# Patient Record
Sex: FEMALE | Race: OTHER | HISPANIC OR LATINO | URBAN - METROPOLITAN AREA
[De-identification: names, ages, dates, MRNs, and addresses within clinical notes are randomized per-mention and may not be internally consistent; named-entity substitution may affect disease eponyms.]

---

## 2017-11-15 ENCOUNTER — OUTPATIENT (OUTPATIENT)
Dept: OUTPATIENT SERVICES | Facility: HOSPITAL | Age: 32
LOS: 1 days | End: 2017-11-15

## 2017-11-15 DIAGNOSIS — O26.899 OTHER SPECIFIED PREGNANCY RELATED CONDITIONS, UNSPECIFIED TRIMESTER: ICD-10-CM

## 2017-11-15 DIAGNOSIS — Z3A.00 WEEKS OF GESTATION OF PREGNANCY NOT SPECIFIED: ICD-10-CM

## 2017-11-25 ENCOUNTER — INPATIENT (INPATIENT)
Facility: HOSPITAL | Age: 32
LOS: 1 days | Discharge: HOME | End: 2017-11-27
Attending: OBSTETRICS & GYNECOLOGY | Admitting: OBSTETRICS & GYNECOLOGY

## 2017-11-25 VITALS — HEIGHT: 65 IN | WEIGHT: 246.92 LBS

## 2017-11-25 DIAGNOSIS — O26.899 OTHER SPECIFIED PREGNANCY RELATED CONDITIONS, UNSPECIFIED TRIMESTER: ICD-10-CM

## 2017-11-25 LAB
ALBUMIN SERPL ELPH-MCNC: 3.4 G/DL — SIGNIFICANT CHANGE UP (ref 3.3–5)
ALP SERPL-CCNC: 196 U/L — HIGH (ref 40–120)
ALT FLD-CCNC: 7 U/L — SIGNIFICANT CHANGE UP (ref 4–33)
APPEARANCE UR: SIGNIFICANT CHANGE UP
APTT BLD: 26.1 SEC — LOW (ref 27.5–37.4)
AST SERPL-CCNC: 13 U/L — SIGNIFICANT CHANGE UP (ref 4–32)
BACTERIA # UR AUTO: SIGNIFICANT CHANGE UP
BASOPHILS # BLD AUTO: 0.04 K/UL — SIGNIFICANT CHANGE UP (ref 0–0.2)
BASOPHILS NFR BLD AUTO: 0.3 % — SIGNIFICANT CHANGE UP (ref 0–2)
BILIRUB SERPL-MCNC: 0.2 MG/DL — SIGNIFICANT CHANGE UP (ref 0.2–1.2)
BILIRUB UR-MCNC: NEGATIVE — SIGNIFICANT CHANGE UP
BLD GP AB SCN SERPL QL: NEGATIVE — SIGNIFICANT CHANGE UP
BLOOD UR QL VISUAL: HIGH
BUN SERPL-MCNC: 5 MG/DL — LOW (ref 7–23)
CALCIUM SERPL-MCNC: 8.6 MG/DL — SIGNIFICANT CHANGE UP (ref 8.4–10.5)
CHLORIDE SERPL-SCNC: 101 MMOL/L — SIGNIFICANT CHANGE UP (ref 98–107)
CO2 SERPL-SCNC: 22 MMOL/L — SIGNIFICANT CHANGE UP (ref 22–31)
COLOR SPEC: SIGNIFICANT CHANGE UP
CREAT ?TM UR-MCNC: 36.5 MG/DL — SIGNIFICANT CHANGE UP
CREAT SERPL-MCNC: 0.38 MG/DL — LOW (ref 0.5–1.3)
EOSINOPHIL # BLD AUTO: 0.06 K/UL — SIGNIFICANT CHANGE UP (ref 0–0.5)
EOSINOPHIL NFR BLD AUTO: 0.4 % — SIGNIFICANT CHANGE UP (ref 0–6)
FIBRINOGEN PPP-MCNC: 670 MG/DL — HIGH (ref 310–510)
GLUCOSE SERPL-MCNC: 93 MG/DL — SIGNIFICANT CHANGE UP (ref 70–99)
GLUCOSE UR-MCNC: NEGATIVE — SIGNIFICANT CHANGE UP
HCT VFR BLD CALC: 41.7 % — SIGNIFICANT CHANGE UP (ref 34.5–45)
HGB BLD-MCNC: 13.3 G/DL — SIGNIFICANT CHANGE UP (ref 11.5–15.5)
IMM GRANULOCYTES # BLD AUTO: 0.1 # — SIGNIFICANT CHANGE UP
IMM GRANULOCYTES NFR BLD AUTO: 0.7 % — SIGNIFICANT CHANGE UP (ref 0–1.5)
INR BLD: 0.9 — SIGNIFICANT CHANGE UP (ref 0.88–1.17)
KETONES UR-MCNC: NEGATIVE — SIGNIFICANT CHANGE UP
LDH SERPL L TO P-CCNC: 212 U/L — SIGNIFICANT CHANGE UP (ref 135–225)
LEUKOCYTE ESTERASE UR-ACNC: HIGH
LYMPHOCYTES # BLD AUTO: 19 % — SIGNIFICANT CHANGE UP (ref 13–44)
LYMPHOCYTES # BLD AUTO: 2.56 K/UL — SIGNIFICANT CHANGE UP (ref 1–3.3)
MCHC RBC-ENTMCNC: 26.9 PG — LOW (ref 27–34)
MCHC RBC-ENTMCNC: 31.9 % — LOW (ref 32–36)
MCV RBC AUTO: 84.4 FL — SIGNIFICANT CHANGE UP (ref 80–100)
MONOCYTES # BLD AUTO: 0.94 K/UL — HIGH (ref 0–0.9)
MONOCYTES NFR BLD AUTO: 7 % — SIGNIFICANT CHANGE UP (ref 2–14)
NEUTROPHILS # BLD AUTO: 9.77 K/UL — HIGH (ref 1.8–7.4)
NEUTROPHILS NFR BLD AUTO: 72.6 % — SIGNIFICANT CHANGE UP (ref 43–77)
NITRITE UR-MCNC: NEGATIVE — SIGNIFICANT CHANGE UP
NON-SQ EPI CELLS # UR AUTO: 1 — SIGNIFICANT CHANGE UP
NRBC # FLD: 0 — SIGNIFICANT CHANGE UP
PH UR: 7 — SIGNIFICANT CHANGE UP (ref 4.6–8)
PLATELET # BLD AUTO: 314 K/UL — SIGNIFICANT CHANGE UP (ref 150–400)
PMV BLD: 10.8 FL — SIGNIFICANT CHANGE UP (ref 7–13)
POTASSIUM SERPL-MCNC: 3.7 MMOL/L — SIGNIFICANT CHANGE UP (ref 3.5–5.3)
POTASSIUM SERPL-SCNC: 3.7 MMOL/L — SIGNIFICANT CHANGE UP (ref 3.5–5.3)
PROT SERPL-MCNC: 6.5 G/DL — SIGNIFICANT CHANGE UP (ref 6–8.3)
PROT UR-MCNC: 26.5 MG/DL — SIGNIFICANT CHANGE UP
PROT UR-MCNC: 30 — HIGH
PROTHROM AB SERPL-ACNC: 10.1 SEC — SIGNIFICANT CHANGE UP (ref 9.8–13.1)
RBC # BLD: 4.94 M/UL — SIGNIFICANT CHANGE UP (ref 3.8–5.2)
RBC # FLD: 16 % — HIGH (ref 10.3–14.5)
RBC CASTS # UR COMP ASSIST: >50 — HIGH (ref 0–?)
RH IG SCN BLD-IMP: POSITIVE — SIGNIFICANT CHANGE UP
SODIUM SERPL-SCNC: 138 MMOL/L — SIGNIFICANT CHANGE UP (ref 135–145)
SP GR SPEC: 1.01 — SIGNIFICANT CHANGE UP (ref 1–1.03)
SQUAMOUS # UR AUTO: SIGNIFICANT CHANGE UP
URATE SERPL-MCNC: 4.5 MG/DL — SIGNIFICANT CHANGE UP (ref 2.5–7)
UROBILINOGEN FLD QL: NORMAL E.U. — SIGNIFICANT CHANGE UP (ref 0.1–0.2)
WBC # BLD: 13.47 K/UL — HIGH (ref 3.8–10.5)
WBC # FLD AUTO: 13.47 K/UL — HIGH (ref 3.8–10.5)
WBC CLUMPS #/AREA URNS HPF: PRESENT — HIGH (ref 0–?)
WBC UR QL: >50 — HIGH (ref 0–?)

## 2017-11-25 RX ORDER — TETANUS TOXOID, REDUCED DIPHTHERIA TOXOID AND ACELLULAR PERTUSSIS VACCINE, ADSORBED 5; 2.5; 8; 8; 2.5 [IU]/.5ML; [IU]/.5ML; UG/.5ML; UG/.5ML; UG/.5ML
0.5 SUSPENSION INTRAMUSCULAR ONCE
Qty: 0 | Refills: 0 | Status: DISCONTINUED | OUTPATIENT
Start: 2017-11-25 | End: 2017-11-25

## 2017-11-25 RX ORDER — TETANUS TOXOID, REDUCED DIPHTHERIA TOXOID AND ACELLULAR PERTUSSIS VACCINE, ADSORBED 5; 2.5; 8; 8; 2.5 [IU]/.5ML; [IU]/.5ML; UG/.5ML; UG/.5ML; UG/.5ML
0.5 SUSPENSION INTRAMUSCULAR ONCE
Qty: 0 | Refills: 0 | Status: COMPLETED | OUTPATIENT
Start: 2017-11-25

## 2017-11-25 RX ORDER — DIPHENHYDRAMINE HCL 50 MG
25 CAPSULE ORAL EVERY 6 HOURS
Qty: 0 | Refills: 0 | Status: DISCONTINUED | OUTPATIENT
Start: 2017-11-25 | End: 2017-11-25

## 2017-11-25 RX ORDER — HYDROCORTISONE 1 %
1 OINTMENT (GRAM) TOPICAL EVERY 4 HOURS
Qty: 0 | Refills: 0 | Status: DISCONTINUED | OUTPATIENT
Start: 2017-11-25 | End: 2017-11-25

## 2017-11-25 RX ORDER — DOCUSATE SODIUM 100 MG
100 CAPSULE ORAL
Qty: 0 | Refills: 0 | Status: DISCONTINUED | OUTPATIENT
Start: 2017-11-25 | End: 2017-11-27

## 2017-11-25 RX ORDER — HYDROCORTISONE 1 %
1 OINTMENT (GRAM) TOPICAL EVERY 4 HOURS
Qty: 0 | Refills: 0 | Status: DISCONTINUED | OUTPATIENT
Start: 2017-11-25 | End: 2017-11-27

## 2017-11-25 RX ORDER — DIBUCAINE 1 %
1 OINTMENT (GRAM) RECTAL EVERY 4 HOURS
Qty: 0 | Refills: 0 | Status: DISCONTINUED | OUTPATIENT
Start: 2017-11-25 | End: 2017-11-25

## 2017-11-25 RX ORDER — KETOROLAC TROMETHAMINE 30 MG/ML
30 SYRINGE (ML) INJECTION ONCE
Qty: 0 | Refills: 0 | Status: DISCONTINUED | OUTPATIENT
Start: 2017-11-25 | End: 2017-11-27

## 2017-11-25 RX ORDER — AER TRAVELER 0.5 G/1
1 SOLUTION RECTAL; TOPICAL EVERY 4 HOURS
Qty: 0 | Refills: 0 | Status: DISCONTINUED | OUTPATIENT
Start: 2017-11-25 | End: 2017-11-27

## 2017-11-25 RX ORDER — GLYCERIN ADULT
1 SUPPOSITORY, RECTAL RECTAL AT BEDTIME
Qty: 0 | Refills: 0 | Status: DISCONTINUED | OUTPATIENT
Start: 2017-11-25 | End: 2017-11-25

## 2017-11-25 RX ORDER — OXYTOCIN 10 UNIT/ML
333.33 VIAL (ML) INJECTION
Qty: 20 | Refills: 0 | Status: DISCONTINUED | OUTPATIENT
Start: 2017-11-25 | End: 2017-11-26

## 2017-11-25 RX ORDER — OXYCODONE AND ACETAMINOPHEN 5; 325 MG/1; MG/1
2 TABLET ORAL EVERY 6 HOURS
Qty: 0 | Refills: 0 | Status: DISCONTINUED | OUTPATIENT
Start: 2017-11-25 | End: 2017-11-27

## 2017-11-25 RX ORDER — ACETAMINOPHEN 500 MG
975 TABLET ORAL EVERY 6 HOURS
Qty: 0 | Refills: 0 | Status: COMPLETED | OUTPATIENT
Start: 2017-11-25 | End: 2018-10-24

## 2017-11-25 RX ORDER — OXYCODONE HYDROCHLORIDE 5 MG/1
5 TABLET ORAL
Qty: 0 | Refills: 0 | Status: DISCONTINUED | OUTPATIENT
Start: 2017-11-25 | End: 2017-11-27

## 2017-11-25 RX ORDER — SODIUM CHLORIDE 9 MG/ML
3 INJECTION INTRAMUSCULAR; INTRAVENOUS; SUBCUTANEOUS EVERY 8 HOURS
Qty: 0 | Refills: 0 | Status: DISCONTINUED | OUTPATIENT
Start: 2017-11-25 | End: 2017-11-25

## 2017-11-25 RX ORDER — OXYTOCIN 10 UNIT/ML
41.67 VIAL (ML) INJECTION
Qty: 20 | Refills: 0 | Status: DISCONTINUED | OUTPATIENT
Start: 2017-11-25 | End: 2017-11-26

## 2017-11-25 RX ORDER — ACETAMINOPHEN 500 MG
975 TABLET ORAL EVERY 6 HOURS
Qty: 0 | Refills: 0 | Status: DISCONTINUED | OUTPATIENT
Start: 2017-11-25 | End: 2017-11-27

## 2017-11-25 RX ORDER — OXYTOCIN 10 UNIT/ML
41.67 VIAL (ML) INJECTION
Qty: 20 | Refills: 0 | Status: DISCONTINUED | OUTPATIENT
Start: 2017-11-25 | End: 2017-11-25

## 2017-11-25 RX ORDER — SODIUM CHLORIDE 9 MG/ML
3 INJECTION INTRAMUSCULAR; INTRAVENOUS; SUBCUTANEOUS EVERY 8 HOURS
Qty: 0 | Refills: 0 | Status: DISCONTINUED | OUTPATIENT
Start: 2017-11-25 | End: 2017-11-27

## 2017-11-25 RX ORDER — AER TRAVELER 0.5 G/1
1 SOLUTION RECTAL; TOPICAL EVERY 4 HOURS
Qty: 0 | Refills: 0 | Status: DISCONTINUED | OUTPATIENT
Start: 2017-11-25 | End: 2017-11-25

## 2017-11-25 RX ORDER — LANOLIN
1 OINTMENT (GRAM) TOPICAL EVERY 6 HOURS
Qty: 0 | Refills: 0 | Status: DISCONTINUED | OUTPATIENT
Start: 2017-11-25 | End: 2017-11-25

## 2017-11-25 RX ORDER — SODIUM CHLORIDE 9 MG/ML
1000 INJECTION, SOLUTION INTRAVENOUS
Qty: 0 | Refills: 0 | Status: DISCONTINUED | OUTPATIENT
Start: 2017-11-25 | End: 2017-11-25

## 2017-11-25 RX ORDER — SODIUM CHLORIDE 9 MG/ML
1000 INJECTION, SOLUTION INTRAVENOUS ONCE
Qty: 0 | Refills: 0 | Status: COMPLETED | OUTPATIENT
Start: 2017-11-25 | End: 2017-11-25

## 2017-11-25 RX ORDER — PRAMOXINE HYDROCHLORIDE 150 MG/15G
1 AEROSOL, FOAM RECTAL EVERY 4 HOURS
Qty: 0 | Refills: 0 | Status: DISCONTINUED | OUTPATIENT
Start: 2017-11-25 | End: 2017-11-27

## 2017-11-25 RX ORDER — OXYCODONE HYDROCHLORIDE 5 MG/1
5 TABLET ORAL
Qty: 0 | Refills: 0 | Status: DISCONTINUED | OUTPATIENT
Start: 2017-11-25 | End: 2017-11-25

## 2017-11-25 RX ORDER — DIBUCAINE 1 %
1 OINTMENT (GRAM) RECTAL EVERY 4 HOURS
Qty: 0 | Refills: 0 | Status: DISCONTINUED | OUTPATIENT
Start: 2017-11-25 | End: 2017-11-27

## 2017-11-25 RX ORDER — MAGNESIUM HYDROXIDE 400 MG/1
30 TABLET, CHEWABLE ORAL
Qty: 0 | Refills: 0 | Status: DISCONTINUED | OUTPATIENT
Start: 2017-11-25 | End: 2017-11-27

## 2017-11-25 RX ORDER — PRAMOXINE HYDROCHLORIDE 150 MG/15G
1 AEROSOL, FOAM RECTAL EVERY 4 HOURS
Qty: 0 | Refills: 0 | Status: DISCONTINUED | OUTPATIENT
Start: 2017-11-25 | End: 2017-11-25

## 2017-11-25 RX ORDER — IBUPROFEN 200 MG
600 TABLET ORAL EVERY 6 HOURS
Qty: 0 | Refills: 0 | Status: DISCONTINUED | OUTPATIENT
Start: 2017-11-25 | End: 2017-11-27

## 2017-11-25 RX ORDER — OXYCODONE HYDROCHLORIDE 5 MG/1
5 TABLET ORAL EVERY 4 HOURS
Qty: 0 | Refills: 0 | Status: DISCONTINUED | OUTPATIENT
Start: 2017-11-25 | End: 2017-11-25

## 2017-11-25 RX ORDER — OXYCODONE HYDROCHLORIDE 5 MG/1
5 TABLET ORAL EVERY 4 HOURS
Qty: 0 | Refills: 0 | Status: DISCONTINUED | OUTPATIENT
Start: 2017-11-25 | End: 2017-11-27

## 2017-11-25 RX ORDER — DOCUSATE SODIUM 100 MG
100 CAPSULE ORAL
Qty: 0 | Refills: 0 | Status: DISCONTINUED | OUTPATIENT
Start: 2017-11-25 | End: 2017-11-25

## 2017-11-25 RX ORDER — SIMETHICONE 80 MG/1
80 TABLET, CHEWABLE ORAL EVERY 6 HOURS
Qty: 0 | Refills: 0 | Status: DISCONTINUED | OUTPATIENT
Start: 2017-11-25 | End: 2017-11-25

## 2017-11-25 RX ORDER — KETOROLAC TROMETHAMINE 30 MG/ML
30 SYRINGE (ML) INJECTION ONCE
Qty: 0 | Refills: 0 | Status: DISCONTINUED | OUTPATIENT
Start: 2017-11-25 | End: 2017-11-25

## 2017-11-25 RX ORDER — IBUPROFEN 200 MG
600 TABLET ORAL EVERY 6 HOURS
Qty: 0 | Refills: 0 | Status: COMPLETED | OUTPATIENT
Start: 2017-11-25 | End: 2018-10-24

## 2017-11-25 RX ORDER — LANOLIN
1 OINTMENT (GRAM) TOPICAL EVERY 6 HOURS
Qty: 0 | Refills: 0 | Status: DISCONTINUED | OUTPATIENT
Start: 2017-11-25 | End: 2017-11-27

## 2017-11-25 RX ORDER — SIMETHICONE 80 MG/1
80 TABLET, CHEWABLE ORAL EVERY 6 HOURS
Qty: 0 | Refills: 0 | Status: DISCONTINUED | OUTPATIENT
Start: 2017-11-25 | End: 2017-11-27

## 2017-11-25 RX ORDER — MAGNESIUM HYDROXIDE 400 MG/1
30 TABLET, CHEWABLE ORAL
Qty: 0 | Refills: 0 | Status: DISCONTINUED | OUTPATIENT
Start: 2017-11-25 | End: 2017-11-25

## 2017-11-25 RX ORDER — OXYTOCIN 10 UNIT/ML
333.33 VIAL (ML) INJECTION
Qty: 20 | Refills: 0 | Status: COMPLETED | OUTPATIENT
Start: 2017-11-25

## 2017-11-25 RX ORDER — DIPHENHYDRAMINE HCL 50 MG
25 CAPSULE ORAL EVERY 6 HOURS
Qty: 0 | Refills: 0 | Status: DISCONTINUED | OUTPATIENT
Start: 2017-11-25 | End: 2017-11-27

## 2017-11-25 RX ORDER — GLYCERIN ADULT
1 SUPPOSITORY, RECTAL RECTAL AT BEDTIME
Qty: 0 | Refills: 0 | Status: DISCONTINUED | OUTPATIENT
Start: 2017-11-25 | End: 2017-11-27

## 2017-11-25 RX ADMIN — Medication 30 MILLIGRAM(S): at 18:35

## 2017-11-25 RX ADMIN — SODIUM CHLORIDE 2000 MILLILITER(S): 9 INJECTION, SOLUTION INTRAVENOUS at 09:10

## 2017-11-25 RX ADMIN — SODIUM CHLORIDE 125 MILLILITER(S): 9 INJECTION, SOLUTION INTRAVENOUS at 08:00

## 2017-11-25 RX ADMIN — Medication 1000 MILLIUNIT(S)/MIN: at 18:09

## 2017-11-25 RX ADMIN — Medication 30 MILLIGRAM(S): at 18:46

## 2017-11-25 RX ADMIN — Medication 125 MILLIUNIT(S)/MIN: at 18:10

## 2017-11-26 RX ORDER — TETANUS TOXOID, REDUCED DIPHTHERIA TOXOID AND ACELLULAR PERTUSSIS VACCINE, ADSORBED 5; 2.5; 8; 8; 2.5 [IU]/.5ML; [IU]/.5ML; UG/.5ML; UG/.5ML; UG/.5ML
0.5 SUSPENSION INTRAMUSCULAR ONCE
Qty: 0 | Refills: 0 | Status: COMPLETED | OUTPATIENT
Start: 2017-11-26 | End: 2017-11-26

## 2017-11-26 RX ORDER — ACETAMINOPHEN 500 MG
975 TABLET ORAL EVERY 6 HOURS
Qty: 0 | Refills: 0 | Status: DISCONTINUED | OUTPATIENT
Start: 2017-11-26 | End: 2017-11-27

## 2017-11-26 RX ADMIN — Medication 600 MILLIGRAM(S): at 18:32

## 2017-11-26 RX ADMIN — Medication 100 MILLIGRAM(S): at 11:25

## 2017-11-26 RX ADMIN — Medication 975 MILLIGRAM(S): at 18:32

## 2017-11-26 RX ADMIN — TETANUS TOXOID, REDUCED DIPHTHERIA TOXOID AND ACELLULAR PERTUSSIS VACCINE, ADSORBED 0.5 MILLILITER(S): 5; 2.5; 8; 8; 2.5 SUSPENSION INTRAMUSCULAR at 21:41

## 2017-11-26 RX ADMIN — Medication 975 MILLIGRAM(S): at 11:24

## 2017-11-26 RX ADMIN — Medication 600 MILLIGRAM(S): at 17:33

## 2017-11-26 RX ADMIN — Medication 600 MILLIGRAM(S): at 11:24

## 2017-11-26 RX ADMIN — Medication 1 TABLET(S): at 11:24

## 2017-11-26 RX ADMIN — Medication 100 MILLIGRAM(S): at 05:13

## 2017-11-26 RX ADMIN — Medication 975 MILLIGRAM(S): at 17:33

## 2017-11-26 RX ADMIN — Medication 600 MILLIGRAM(S): at 05:13

## 2017-11-26 RX ADMIN — Medication 975 MILLIGRAM(S): at 05:13

## 2017-11-26 RX ADMIN — Medication 600 MILLIGRAM(S): at 12:22

## 2017-11-26 RX ADMIN — Medication 600 MILLIGRAM(S): at 06:13

## 2017-11-26 NOTE — PROGRESS NOTE ADULT - SUBJECTIVE AND OBJECTIVE BOX
Patient seen and examined at bedside, no acute overnight events. No acute complaints, pain well controlled with pain medication, c/o pain with breastfeeding as she has increased cramping. Patient is ambulating, voiding spontaneously, passing gas, and tolerating regular diet. Pt is breast feeding her baby.    Vital Signs Last 24 Hours  T(C): 36.9 (11-26-17 @ 01:49), Max: 37.1 (11-25-17 @ 17:51)  HR: 72 (11-26-17 @ 01:49) (68 - 101)  BP: 110/67 (11-26-17 @ 01:49) (110/67 - 137/62)  RR: 18 (11-26-17 @ 01:49) (16 - 18)  SpO2: 97% (11-26-17 @ 01:49) (97% - 100%)    Physical Exam:  General: NAD  Abdomen: Soft, non-tender, non-distended, fundus firm  Pelvic: Lochia wnl    Labs:    Blood Type: O Positive  Antibody Screen: Negative               13.3   13.47 )-----------( 314      ( 11-25 @ 05:40 )             41.7         MEDICATIONS  (STANDING):  acetaminophen   Tablet. 975 milliGRAM(s) Oral every 6 hours  diphtheria/tetanus/pertussis (acellular) Vaccine (ADAcel) 0.5 milliLiter(s) IntraMuscular once  ibuprofen  Tablet 600 milliGRAM(s) Oral every 6 hours  ibuprofen  Tablet 600 milliGRAM(s) Oral every 6 hours  ketorolac   Injectable 30 milliGRAM(s) IV Push once  oxyCODONE    IR 5 milliGRAM(s) Oral every 3 hours  oxytocin Infusion 333.333 milliUNIT(s)/Min (1000 mL/Hr) IV Continuous <Continuous>  oxytocin Infusion 41.667 milliUNIT(s)/Min (125 mL/Hr) IV Continuous <Continuous>  prenatal multivitamin 1 Tablet(s) Oral daily  sodium chloride 0.9% lock flush 3 milliLiter(s) IV Push every 8 hours    MEDICATIONS  (PRN):  acetaminophen   Tablet 975 milliGRAM(s) Oral every 6 hours PRN For Temp greater than 38.5 C (101.3 F)  dibucaine 1% Ointment 1 Application(s) Topical every 4 hours PRN Perineal Discomfort  diphenhydrAMINE   Capsule 25 milliGRAM(s) Oral every 6 hours PRN Itching  docusate sodium 100 milliGRAM(s) Oral two times a day PRN Stool Softening  glycerin Suppository - Adult 1 Suppository(s) Rectal at bedtime PRN Constipation  hydrocortisone 1% Cream 1 Application(s) Topical every 4 hours PRN Moderate to Severe Perineal Pain  lanolin Ointment 1 Application(s) Topical every 6 hours PRN Sore Nipples  magnesium hydroxide Suspension 30 milliLiter(s) Oral two times a day PRN Constipation  oxyCODONE    5 mG/acetaminophen 325 mG 2 Tablet(s) Oral every 6 hours PRN Severe Pain  oxyCODONE    IR 5 milliGRAM(s) Oral every 4 hours PRN Severe Pain (7 -10)  pramoxine 1%/zinc 5% Cream 1 Application(s) Topical every 4 hours PRN Moderate to Severe Perineal Pain  simethicone 80 milliGRAM(s) Chew every 6 hours PRN Gas  witch hazel Pads 1 Application(s) Topical every 4 hours PRN Perineal Discomfort

## 2017-11-27 ENCOUNTER — TRANSCRIPTION ENCOUNTER (OUTPATIENT)
Age: 32
End: 2017-11-27

## 2017-11-27 VITALS
TEMPERATURE: 98 F | HEART RATE: 65 BPM | RESPIRATION RATE: 18 BRPM | SYSTOLIC BLOOD PRESSURE: 114 MMHG | DIASTOLIC BLOOD PRESSURE: 66 MMHG | OXYGEN SATURATION: 98 %

## 2017-11-27 DIAGNOSIS — E05.90 THYROTOXICOSIS, UNSPECIFIED WITHOUT THYROTOXIC CRISIS OR STORM: ICD-10-CM

## 2017-11-27 DIAGNOSIS — O42.10 PREMATURE RUPTURE OF MEMBRANES, ONSET OF LABOR MORE THAN 24 HOURS FOLLOWING RUPTURE, UNSPECIFIED WEEKS OF GESTATION: ICD-10-CM

## 2017-11-27 LAB — T PALLIDUM AB TITR SER: NEGATIVE — SIGNIFICANT CHANGE UP

## 2017-11-27 RX ORDER — PROPYLTHIOURACIL 50 MG
0 TABLET ORAL
Qty: 0 | Refills: 0 | COMMUNITY

## 2017-11-27 RX ORDER — IBUPROFEN 200 MG
1 TABLET ORAL
Qty: 0 | Refills: 0 | DISCHARGE
Start: 2017-11-27

## 2017-11-27 RX ADMIN — Medication 600 MILLIGRAM(S): at 00:13

## 2017-11-27 RX ADMIN — Medication 975 MILLIGRAM(S): at 01:00

## 2017-11-27 RX ADMIN — Medication 600 MILLIGRAM(S): at 01:00

## 2017-11-27 RX ADMIN — Medication 975 MILLIGRAM(S): at 00:13

## 2017-11-27 RX ADMIN — Medication 975 MILLIGRAM(S): at 06:50

## 2017-11-27 RX ADMIN — Medication 600 MILLIGRAM(S): at 06:50

## 2017-11-27 NOTE — DISCHARGE NOTE OB - MEDICATION SUMMARY - MEDICATIONS TO TAKE
I will START or STAY ON the medications listed below when I get home from the hospital:    ibuprofen 600 mg oral tablet  -- 1 tab(s) by mouth every 6 hours  -- Indication: For Vaginal delivery    Prenatal Multivitamins with Folic Acid 1 mg oral tablet  -- 1 tab(s) by mouth once a day  -- Indication: For Vaginal delivery

## 2017-11-27 NOTE — DISCHARGE NOTE OB - CARE PLAN
Principal Discharge DX:	Vaginal delivery  Goal:	normal postpartum course  Instructions for follow-up, activity and diet:	normal diet and activity  Secondary Diagnosis:	Hyperthyroidism

## 2017-11-27 NOTE — PROGRESS NOTE ADULT - ASSESSMENT
33y/o  PPD2 sp  in stable condition. Dx w/ PEC by BP and P/C after delivery not  reuiring meds, BP well controlled. PMH sig for hyperthyroid, currently on PTU and nursing.

## 2017-11-27 NOTE — DISCHARGE NOTE OB - HOSPITAL COURSE
Patient had Patient had normal vaginal delivery without any complications and normal postpartum course

## 2017-11-27 NOTE — DISCHARGE NOTE OB - PATIENT PORTAL LINK FT
“You can access the [facility] Patient Portal, offered by [facility], by registering with the following website: [email or web address]”

## 2017-11-27 NOTE — PROGRESS NOTE ADULT - PROBLEM SELECTOR PLAN 2
-Currently on PTU 50mg TID, although pt was only taking medication once daily and has not been taking it in postpartum period for fear of affect on lactation  -PTU is garnered safe in lactation, LFTs should be periodically examined   -Pt will need f/u with Dr Khanna for medication adjustment and evaluation of thyroid function post partum   -asymptomatic at this time

## 2018-09-26 ENCOUNTER — EMERGENCY (EMERGENCY)
Facility: HOSPITAL | Age: 33
LOS: 1 days | Discharge: HOME | End: 2018-09-26
Attending: EMERGENCY MEDICINE
Payer: COMMERCIAL

## 2018-09-26 VITALS
OXYGEN SATURATION: 99 % | RESPIRATION RATE: 16 BRPM | SYSTOLIC BLOOD PRESSURE: 113 MMHG | DIASTOLIC BLOOD PRESSURE: 72 MMHG | HEART RATE: 70 BPM | TEMPERATURE: 98 F

## 2018-09-26 VITALS
RESPIRATION RATE: 16 BRPM | HEIGHT: 65 IN | SYSTOLIC BLOOD PRESSURE: 137 MMHG | HEART RATE: 73 BPM | DIASTOLIC BLOOD PRESSURE: 89 MMHG | OXYGEN SATURATION: 100 % | WEIGHT: 220.02 LBS | TEMPERATURE: 98 F

## 2018-09-26 LAB
ALBUMIN SERPL ELPH-MCNC: 3.3 G/DL — LOW (ref 3.5–5)
ALP SERPL-CCNC: 77 U/L — SIGNIFICANT CHANGE UP (ref 40–120)
ALT FLD-CCNC: 20 U/L DA — SIGNIFICANT CHANGE UP (ref 10–60)
ANION GAP SERPL CALC-SCNC: 5 MMOL/L — SIGNIFICANT CHANGE UP (ref 5–17)
AST SERPL-CCNC: 10 U/L — SIGNIFICANT CHANGE UP (ref 10–40)
BASOPHILS # BLD AUTO: 0 K/UL — SIGNIFICANT CHANGE UP (ref 0–0.2)
BASOPHILS NFR BLD AUTO: 0.5 % — SIGNIFICANT CHANGE UP (ref 0–2)
BILIRUB SERPL-MCNC: 0.4 MG/DL — SIGNIFICANT CHANGE UP (ref 0.2–1.2)
BUN SERPL-MCNC: 6 MG/DL — LOW (ref 7–18)
CALCIUM SERPL-MCNC: 8.5 MG/DL — SIGNIFICANT CHANGE UP (ref 8.4–10.5)
CHLORIDE SERPL-SCNC: 104 MMOL/L — SIGNIFICANT CHANGE UP (ref 96–108)
CO2 SERPL-SCNC: 27 MMOL/L — SIGNIFICANT CHANGE UP (ref 22–31)
CREAT SERPL-MCNC: 0.52 MG/DL — SIGNIFICANT CHANGE UP (ref 0.5–1.3)
EOSINOPHIL # BLD AUTO: 0.1 K/UL — SIGNIFICANT CHANGE UP (ref 0–0.5)
EOSINOPHIL NFR BLD AUTO: 1.2 % — SIGNIFICANT CHANGE UP (ref 0–6)
GLUCOSE SERPL-MCNC: 101 MG/DL — HIGH (ref 70–99)
HCG UR QL: NEGATIVE — SIGNIFICANT CHANGE UP
HCT VFR BLD CALC: 42.7 % — SIGNIFICANT CHANGE UP (ref 34.5–45)
HGB BLD-MCNC: 13.7 G/DL — SIGNIFICANT CHANGE UP (ref 11.5–15.5)
LIDOCAIN IGE QN: 74 U/L — SIGNIFICANT CHANGE UP (ref 73–393)
LYMPHOCYTES # BLD AUTO: 1.1 K/UL — SIGNIFICANT CHANGE UP (ref 1–3.3)
LYMPHOCYTES # BLD AUTO: 10.6 % — LOW (ref 13–44)
MCHC RBC-ENTMCNC: 28 PG — SIGNIFICANT CHANGE UP (ref 27–34)
MCHC RBC-ENTMCNC: 32.1 GM/DL — SIGNIFICANT CHANGE UP (ref 32–36)
MCV RBC AUTO: 87.3 FL — SIGNIFICANT CHANGE UP (ref 80–100)
MONOCYTES # BLD AUTO: 0.6 K/UL — SIGNIFICANT CHANGE UP (ref 0–0.9)
MONOCYTES NFR BLD AUTO: 6.4 % — SIGNIFICANT CHANGE UP (ref 2–14)
NEUTROPHILS # BLD AUTO: 8.2 K/UL — HIGH (ref 1.8–7.4)
NEUTROPHILS NFR BLD AUTO: 81.3 % — HIGH (ref 43–77)
PLATELET # BLD AUTO: 275 K/UL — SIGNIFICANT CHANGE UP (ref 150–400)
POTASSIUM SERPL-MCNC: 4.4 MMOL/L — SIGNIFICANT CHANGE UP (ref 3.5–5.3)
POTASSIUM SERPL-SCNC: 4.4 MMOL/L — SIGNIFICANT CHANGE UP (ref 3.5–5.3)
PROT SERPL-MCNC: 7.5 G/DL — SIGNIFICANT CHANGE UP (ref 6–8.3)
RBC # BLD: 4.9 M/UL — SIGNIFICANT CHANGE UP (ref 3.8–5.2)
RBC # FLD: 12.8 % — SIGNIFICANT CHANGE UP (ref 10.3–14.5)
SODIUM SERPL-SCNC: 136 MMOL/L — SIGNIFICANT CHANGE UP (ref 135–145)
WBC # BLD: 10.2 K/UL — SIGNIFICANT CHANGE UP (ref 3.8–10.5)
WBC # FLD AUTO: 10.2 K/UL — SIGNIFICANT CHANGE UP (ref 3.8–10.5)

## 2018-09-26 PROCEDURE — 83690 ASSAY OF LIPASE: CPT

## 2018-09-26 PROCEDURE — 81025 URINE PREGNANCY TEST: CPT

## 2018-09-26 PROCEDURE — 71046 X-RAY EXAM CHEST 2 VIEWS: CPT | Mod: 26

## 2018-09-26 PROCEDURE — 99284 EMERGENCY DEPT VISIT MOD MDM: CPT | Mod: 25

## 2018-09-26 PROCEDURE — 71046 X-RAY EXAM CHEST 2 VIEWS: CPT

## 2018-09-26 PROCEDURE — 93005 ELECTROCARDIOGRAM TRACING: CPT

## 2018-09-26 PROCEDURE — 96374 THER/PROPH/DIAG INJ IV PUSH: CPT

## 2018-09-26 PROCEDURE — 99285 EMERGENCY DEPT VISIT HI MDM: CPT | Mod: 25

## 2018-09-26 PROCEDURE — 85027 COMPLETE CBC AUTOMATED: CPT

## 2018-09-26 PROCEDURE — 80053 COMPREHEN METABOLIC PANEL: CPT

## 2018-09-26 RX ORDER — FAMOTIDINE 10 MG/ML
20 INJECTION INTRAVENOUS ONCE
Qty: 0 | Refills: 0 | Status: COMPLETED | OUTPATIENT
Start: 2018-09-26 | End: 2018-09-26

## 2018-09-26 RX ORDER — OMEPRAZOLE 10 MG/1
1 CAPSULE, DELAYED RELEASE ORAL
Qty: 10 | Refills: 0
Start: 2018-09-26 | End: 2018-10-05

## 2018-09-26 RX ADMIN — Medication 30 MILLILITER(S): at 09:59

## 2018-09-26 RX ADMIN — FAMOTIDINE 20 MILLIGRAM(S): 10 INJECTION INTRAVENOUS at 09:10

## 2018-09-26 NOTE — ED PROVIDER NOTE - PROGRESS NOTE DETAILS
Pt with some improvement after meds.  Suspect GERD vs. PUD.  Advised strict return precautions and PMD and GI f/u.  Advised H2 blocker or PPI.

## 2018-09-26 NOTE — ED PROVIDER NOTE - OBJECTIVE STATEMENT
32 y/o F pt with a PMHx of Stomach ulcers and Hyperthyroidism and no significant PSHx BIB EMS for chest pain today. Pt notes associated chest pressure, burning sensation in chest, nausea, mild SOB, and diarrhea as well. Per pt, x3 days ago she felt sore to touch in the middle of the chest and was unsure why. Pt states that she made an appointment with her PMD for this Friday. Pt reports, that this morning she felt as if a brick was on her chest today and felt heavy weight. Pt reports that she had multiple episodes of diarrhea today as well as well as nausea. Pt states that upon arrival to the ED, she felt a burning sensation in her chest that she relates feeling similar to when she had a stomach ulcer 5 years ago (diagnosed via endoscopy). Pt states that she had mild SOB earlier but is not having any currently in the ED. Pt also states that she is currently not feeling nauseous. Pt notes a Hx of Hyperthyroidism but endorses that she currently does not have any thyroid trouble. Pt denies any Hx of DVT or PE, Hx of gallstones, Hx of kidney stones, use of acid blocker, vomiting, fever, chills, urinary sx, or any other complaints. NKDA.

## 2018-09-26 NOTE — ED ADULT NURSE NOTE - OBJECTIVE STATEMENT
Intermittent chest pain since Sunday woke up this morning with chest pain described as a brick on her chest and SOB

## 2018-09-26 NOTE — ED PROVIDER NOTE - MEDICAL DECISION MAKING DETAILS
Pt with epigastric and chest discomfort, nausea, and diarrhea; minimal epigastric tenderness on exam. Suspect most likely GERD versus PUD; will give Pepcid, check labs, EKG, CXR. Pt is PERC rule negative.

## 2018-09-26 NOTE — ED ADULT NURSE NOTE - NSIMPLEMENTINTERV_GEN_ALL_ED
Implemented All Universal Safety Interventions:  [place] to call system. Call bell, personal items and telephone within reach. Instruct patient to call for assistance. Room bathroom lighting operational. Non-slip footwear when patient is off stretcher. Physically safe environment: no spills, clutter or unnecessary equipment. Stretcher in lowest position, wheels locked, appropriate side rails in place.

## 2018-12-15 PROBLEM — E05.90 THYROTOXICOSIS, UNSPECIFIED WITHOUT THYROTOXIC CRISIS OR STORM: Chronic | Status: ACTIVE | Noted: 2018-09-26

## 2018-12-15 PROBLEM — K25.9 GASTRIC ULCER, UNSPECIFIED AS ACUTE OR CHRONIC, WITHOUT HEMORRHAGE OR PERFORATION: Chronic | Status: ACTIVE | Noted: 2018-09-26

## 2018-12-31 NOTE — ED ADULT NURSE NOTE - NSSISCREENINGQ4_ED_A_ED
Vaccine Information Statement(s) was given today. This has been reviewed, questions answered, and verbal consent given by Parent for injection(s) and administration of Human papillomavirus (HPV) (patient waited the recommended 15 mins after receiving the HPV vaccine). Patient tolerated without incident. See immunization grid for documentation. No

## 2019-03-11 ENCOUNTER — APPOINTMENT (OUTPATIENT)
Dept: ENDOCRINOLOGY | Facility: CLINIC | Age: 34
End: 2019-03-11

## 2019-07-19 ENCOUNTER — EMERGENCY (EMERGENCY)
Facility: HOSPITAL | Age: 34
LOS: 1 days | Discharge: HOME | End: 2019-07-19
Attending: EMERGENCY MEDICINE | Admitting: EMERGENCY MEDICINE
Payer: COMMERCIAL

## 2019-07-19 VITALS
DIASTOLIC BLOOD PRESSURE: 88 MMHG | RESPIRATION RATE: 16 BRPM | HEART RATE: 94 BPM | SYSTOLIC BLOOD PRESSURE: 139 MMHG | OXYGEN SATURATION: 100 % | TEMPERATURE: 98 F

## 2019-07-19 LAB
ALBUMIN SERPL ELPH-MCNC: 4.1 G/DL — SIGNIFICANT CHANGE UP (ref 3.3–5)
ALP SERPL-CCNC: 70 U/L — SIGNIFICANT CHANGE UP (ref 40–120)
ALT FLD-CCNC: 9 U/L — SIGNIFICANT CHANGE UP (ref 4–33)
ANION GAP SERPL CALC-SCNC: 10 MMO/L — SIGNIFICANT CHANGE UP (ref 7–14)
APPEARANCE UR: CLEAR — SIGNIFICANT CHANGE UP
AST SERPL-CCNC: 15 U/L — SIGNIFICANT CHANGE UP (ref 4–32)
BASE EXCESS BLDV CALC-SCNC: 5 MMOL/L — SIGNIFICANT CHANGE UP
BASOPHILS # BLD AUTO: 0.05 K/UL — SIGNIFICANT CHANGE UP (ref 0–0.2)
BASOPHILS NFR BLD AUTO: 0.4 % — SIGNIFICANT CHANGE UP (ref 0–2)
BILIRUB SERPL-MCNC: < 0.2 MG/DL — LOW (ref 0.2–1.2)
BILIRUB UR-MCNC: NEGATIVE — SIGNIFICANT CHANGE UP
BLOOD GAS VENOUS - CREATININE: 0.58 MG/DL — SIGNIFICANT CHANGE UP (ref 0.5–1.3)
BLOOD UR QL VISUAL: NEGATIVE — SIGNIFICANT CHANGE UP
BUN SERPL-MCNC: 9 MG/DL — SIGNIFICANT CHANGE UP (ref 7–23)
CALCIUM SERPL-MCNC: 9.5 MG/DL — SIGNIFICANT CHANGE UP (ref 8.4–10.5)
CHLORIDE BLDV-SCNC: 107 MMOL/L — SIGNIFICANT CHANGE UP (ref 96–108)
CHLORIDE SERPL-SCNC: 102 MMOL/L — SIGNIFICANT CHANGE UP (ref 98–107)
CO2 SERPL-SCNC: 26 MMOL/L — SIGNIFICANT CHANGE UP (ref 22–31)
COLOR SPEC: SIGNIFICANT CHANGE UP
CREAT SERPL-MCNC: 0.59 MG/DL — SIGNIFICANT CHANGE UP (ref 0.5–1.3)
EOSINOPHIL # BLD AUTO: 0.07 K/UL — SIGNIFICANT CHANGE UP (ref 0–0.5)
EOSINOPHIL NFR BLD AUTO: 0.5 % — SIGNIFICANT CHANGE UP (ref 0–6)
GAS PNL BLDV: 133 MMOL/L — LOW (ref 136–146)
GLUCOSE BLDV-MCNC: 102 MG/DL — HIGH (ref 70–99)
GLUCOSE SERPL-MCNC: 101 MG/DL — HIGH (ref 70–99)
GLUCOSE UR-MCNC: NEGATIVE — SIGNIFICANT CHANGE UP
HCO3 BLDV-SCNC: 27 MMOL/L — SIGNIFICANT CHANGE UP (ref 20–27)
HCT VFR BLD CALC: 39.1 % — SIGNIFICANT CHANGE UP (ref 34.5–45)
HCT VFR BLDV CALC: 40.4 % — SIGNIFICANT CHANGE UP (ref 34.5–45)
HGB BLD-MCNC: 12.6 G/DL — SIGNIFICANT CHANGE UP (ref 11.5–15.5)
HGB BLDV-MCNC: 13.1 G/DL — SIGNIFICANT CHANGE UP (ref 11.5–15.5)
IMM GRANULOCYTES NFR BLD AUTO: 0.3 % — SIGNIFICANT CHANGE UP (ref 0–1.5)
KETONES UR-MCNC: NEGATIVE — SIGNIFICANT CHANGE UP
LACTATE BLDV-MCNC: 1.5 MMOL/L — SIGNIFICANT CHANGE UP (ref 0.5–2)
LEUKOCYTE ESTERASE UR-ACNC: NEGATIVE — SIGNIFICANT CHANGE UP
LIDOCAIN IGE QN: 16.3 U/L — SIGNIFICANT CHANGE UP (ref 7–60)
LYMPHOCYTES # BLD AUTO: 26.7 % — SIGNIFICANT CHANGE UP (ref 13–44)
LYMPHOCYTES # BLD AUTO: 3.43 K/UL — HIGH (ref 1–3.3)
MCHC RBC-ENTMCNC: 28.3 PG — SIGNIFICANT CHANGE UP (ref 27–34)
MCHC RBC-ENTMCNC: 32.2 % — SIGNIFICANT CHANGE UP (ref 32–36)
MCV RBC AUTO: 87.9 FL — SIGNIFICANT CHANGE UP (ref 80–100)
MONOCYTES # BLD AUTO: 0.99 K/UL — HIGH (ref 0–0.9)
MONOCYTES NFR BLD AUTO: 7.7 % — SIGNIFICANT CHANGE UP (ref 2–14)
NEUTROPHILS # BLD AUTO: 8.28 K/UL — HIGH (ref 1.8–7.4)
NEUTROPHILS NFR BLD AUTO: 64.4 % — SIGNIFICANT CHANGE UP (ref 43–77)
NITRITE UR-MCNC: NEGATIVE — SIGNIFICANT CHANGE UP
NRBC # FLD: 0 K/UL — SIGNIFICANT CHANGE UP (ref 0–0)
PCO2 BLDV: 54 MMHG — HIGH (ref 41–51)
PH BLDV: 7.37 PH — SIGNIFICANT CHANGE UP (ref 7.32–7.43)
PH UR: 6 — SIGNIFICANT CHANGE UP (ref 5–8)
PLATELET # BLD AUTO: 318 K/UL — SIGNIFICANT CHANGE UP (ref 150–400)
PMV BLD: 10.1 FL — SIGNIFICANT CHANGE UP (ref 7–13)
PO2 BLDV: 27 MMHG — LOW (ref 35–40)
POTASSIUM BLDV-SCNC: 3.3 MMOL/L — LOW (ref 3.4–4.5)
POTASSIUM SERPL-MCNC: 3.7 MMOL/L — SIGNIFICANT CHANGE UP (ref 3.5–5.3)
POTASSIUM SERPL-SCNC: 3.7 MMOL/L — SIGNIFICANT CHANGE UP (ref 3.5–5.3)
PROT SERPL-MCNC: 7.5 G/DL — SIGNIFICANT CHANGE UP (ref 6–8.3)
PROT UR-MCNC: NEGATIVE — SIGNIFICANT CHANGE UP
RBC # BLD: 4.45 M/UL — SIGNIFICANT CHANGE UP (ref 3.8–5.2)
RBC # FLD: 13 % — SIGNIFICANT CHANGE UP (ref 10.3–14.5)
SAO2 % BLDV: 43.2 % — LOW (ref 60–85)
SODIUM SERPL-SCNC: 138 MMOL/L — SIGNIFICANT CHANGE UP (ref 135–145)
SP GR SPEC: 1.01 — SIGNIFICANT CHANGE UP (ref 1–1.04)
UROBILINOGEN FLD QL: NORMAL — SIGNIFICANT CHANGE UP
WBC # BLD: 12.86 K/UL — HIGH (ref 3.8–10.5)
WBC # FLD AUTO: 12.86 K/UL — HIGH (ref 3.8–10.5)

## 2019-07-19 PROCEDURE — 99284 EMERGENCY DEPT VISIT MOD MDM: CPT

## 2019-07-19 NOTE — ED ADULT NURSE NOTE - NSIMPLEMENTINTERV_GEN_ALL_ED
Implemented All Universal Safety Interventions:  [place] to call system. Call bell, personal items and telephone within reach. Instruct patient to call for assistance. Room bathroom lighting operational. Non-slip footwear when patient is off stretcher. Physically safe environment: no spills, clutter or unnecessary equipment. Stretcher in lowest position, wheels locked, appropriate side rails in place.

## 2019-07-19 NOTE — ED PROVIDER NOTE - CARDIAC, MLM
Normal rate, regular rhythm.  Heart sounds S1, S2.  No murmurs, rubs or gallops. 2+ carotid pulses no bruits.

## 2019-07-19 NOTE — ED PROVIDER NOTE - ALCOHOL USE HISTORY SINGLE SELECT
"Pt presents ED c/o flu like symptoms X 2-3 days. Pt reports headache described as throbbing/ pressure, tinnitus, pressure to eyes. Rash to trunk. Pt reports pain to left shoulder, from neck to back, and tenderness to left elbow. Pt reports pain has been present X 1 month. Pt reports nasal drainage, and "several cold" X 1 month ago. Reports cough productive of green mucous. Pt reports did not get flu shot this season.    " yes...

## 2019-07-19 NOTE — ED ADULT NURSE NOTE - OBJECTIVE STATEMENT
Pt received to ER#7. Pt A&Ox3; c/o intermittent abdominal pain beginning Thursday afternoon accompanied by nausea. Pt was seen at local urgent care for same and was told she had a peptic ulcer and was given Rx for omeprazole. Pt states this morning she started having "tearing" sensations in the abdomen.

## 2019-07-19 NOTE — ED PROVIDER NOTE - OBJECTIVE STATEMENT
Pt is a 34yo F with PMH of hyperthyroidism who presents to the emergency department with chief complaint of abdominal pain. Pt explains this pain started yesterday afternoon while she was sitting at her desk and she describes this pain as "sharp," and "tearing." When she woke up today and she went to get out of bed she felt a "ripping" pain and then when she bent over to pick something up she felt that her pain was 10/10 so she went to an urgent care who then sent her here. Pt states at its worst pain is 10/10 but at rest is 2/10. Pain does not radiate, is not alleviated by Motrin, and is not tender to the touch. LMP 2 weeks ago normal. Pt denies chest pain, shortness of breath, vomiting, diarrhea, melena. Pt is a 34yo F with PMH of hyperthyroidism who presents to the emergency department with chief complaint of abdominal pain. Pt explains this pain started yesterday afternoon while she was sitting at her desk and she describes this pain as "sharp," and "tearing." When she woke up today and she went to get out of bed she felt a "ripping" pain and then when she bent over to pick something up she felt that her pain was 10/10 so she went to an urgent care who then sent her here. Pt states at its worst pain is 10/10 but at rest is 2/10. Pain does not radiate, is not alleviated by Motrin, and is not tender to the touch. Pt states she does not take Motrin frequently and this pain feels different to her than previous ulcer. LMP 2 weeks ago normal. Pt denies chest pain, shortness of breath, vomiting, diarrhea, melena. Pt is a 32yo F with PMH of hyperthyroidism who presents to the emergency department with chief complaint of abdominal pain. Pt explains this pain started yesterday afternoon while she was sitting at her desk and she describes this pain as "sharp," and "tearing." When she woke up today and she went to get out of bed she felt a "ripping" pain and then when she bent over to pick something up she felt that her pain was 10/10 so she went to an urgent care who then sent her here. Pt states at its worst pain is 10/10 but at rest is 2/10. Pain does not radiate, is not alleviated by Motrin, and is not tender to the touch. Pt states she does not take Motrin frequently and this pain feels different to her than previous ulcer. LMP 2 weeks ago and normal. Pt denies chest pain, shortness of breath, vomiting, diarrhea, melena. Pt is a 34yo F with PMH of hyperthyroidism who presents to the emergency department with chief complaint of abdominal pain. Pt explains this pain started yesterday afternoon while she was sitting at her desk and she describes this pain as "sharp," and "tearing." When she woke up today and she went to get out of bed she felt a "ripping" pain in the left anterior abd wall and then when she bent over to pick something up she felt that her pain was 10/10 so she went to an urgent care who then sent her here. Pt states at its worst pain is 10/10 but at rest is 2/10. Pain does not radiate, is not alleviated by Motrin, and is not tender to the touch. Pt states she does not take Motrin frequently and this pain feels different to her than previous ulcer. LMP 2 weeks ago and normal. Pt denies chest pain, shortness of breath, vomiting, diarrhea, melena. Is well appearing, currently pain free, and unable to reproduce pain on exam.

## 2019-07-19 NOTE — ED PROVIDER NOTE - MUSCULOSKELETAL, MLM
Spine appears normal, range of motion is not limited, no muscle or joint tenderness. 2+ radial, DP & PT pulses.

## 2019-07-19 NOTE — ED PROVIDER NOTE - PROGRESS NOTE DETAILS
April Ross, Resident: patient seen and evaluated, feels better. Ok for dc. will give return precautions. Tylenol/motrin at home for pain control. PMD follow up. Attestation - Attending + Med Student   I have personally seen and examined this patient and I have fully participated in their care.  I have reviewed all pertinent clinical information, including the history, physical exam, plan and medical student's note, and agree except as noted. 32yo F with PMH of hyperthyroidism who presents with chief complaint of abdominal pain that is exacerbated by bending over. Unclear etiology, produced by specific positions only, but now non reproducible.  Abd exam and general exam unremarkable.  Would provide pain control, CXR to assess for hiatal hernia, and reassess.

## 2019-07-19 NOTE — ED PROVIDER NOTE - CLINICAL SUMMARY MEDICAL DECISION MAKING FREE TEXT BOX
Pt is a 32yo F with PMH of hyperthyroidism who presents with chief complaint of abdominal pain that is exacerbated by bending over. Considered aortic dissection however no radiation of the pain, carotid/radial/DP/PT pulses intact, aorta is palpable and within normal limits, and vital signs are stable. Considered pancreatitis given location of pain will obtain lipase, CMP. Considered iliopsoas abscess will obtain CBC. Considered kidney stone however no urinary complaints and pain description is atypical for renal colic - will obtain urinalysis. Considered musculoskeletal as pain is only brought on with movement - will provide pain control and reassess. Pt is a 32yo F with PMH of hyperthyroidism who presents with chief complaint of abdominal pain that is exacerbated by bending over. Considered aortic dissection however no radiation of the pain, carotid/radial/DP/PT pulses intact, aorta is palpable and within normal limits, and vital signs are stable. Considered pancreatitis given location of pain will obtain lipase, CMP. Considered iliopsoas abscess will obtain CBC. Considered recurrent ulcer however pt states this feels different to her than previous ulcer. Considered kidney stone however no urinary complaints and pain description is atypical for renal colic - will obtain urinalysis. Considered musculoskeletal as pain is only brought on with movement - will provide pain control and reassess. 34yo F with PMH of hyperthyroidism who presents with chief complaint of abdominal pain that is exacerbated by bending over. Unclear etiology, produced by specific positions only, but now non reproducible.  Abd exam and general exam unremarkable.  Would provide pain control, CXR to assess for hiatal hernia, and reassess.

## 2019-07-19 NOTE — ED PROVIDER NOTE - NSFOLLOWUPINSTRUCTIONS_ED_ALL_ED_FT
- Please see your primary doctor within the next week for follow up.    - Come back for any worsening symptoms.    - Tylenol and motrin as directed for pain.          Muscle Pain, Adult  Muscle pain (myalgia) may be mild or severe. In most cases, the pain lasts only a short time and it goes away without treatment. It is normal to feel some muscle pain after starting a workout program. Muscles that have not been used often will be sore at first.    Muscle pain may also be caused by many other things, including:  Overuse or muscle strain, especially if you are not in shape. This is the most common cause of muscle pain.  Injury.  Bruises.  Viruses, such as the flu.  Infectious diseases.  A chronic condition that causes muscle tenderness, fatigue, and headache (fibromyalgia).  A condition, such as lupus, in which the body’s disease-fighting system attacks other organs in the body (autoimmune or rheumatologic diseases).  Certain drugs, including ACE inhibitors and statins.  To diagnose the cause of your muscle pain, your health care provider will do a physical exam and ask questions about the pain and when it began. If you have not had muscle pain for very long, your health care provider may want to wait before doing much testing. If your muscle pain has lasted a long time, your health care provider may want to run tests right away. In some cases, this may include tests to rule out certain conditions or illnesses.    Treatment for muscle pain depends on the cause. Home care is often enough to relieve muscle pain. Your health care provider may also prescribe anti-inflammatory medicine.    Follow these instructions at home:  Activity     If overuse is causing your muscle pain:  Slow down your activities until the pain goes away.  Do regular, gentle exercises if you are not usually active.  Warm up before exercising. Stretch before and after exercising. This can help lower the risk of muscle pain.  Do not continue working out if the pain is very bad. Bad pain could mean that you have injured a muscle.  Managing pain and discomfort     Image   If directed, apply ice to the sore muscle:  Put ice in a plastic bag.  Place a towel between your skin and the bag.  Leave the ice on for 20 minutes, 2–3 times a day.  You may also alternate between applying ice and applying heat as told by your health care provider. To apply heat, use the heat source that your health care provider recommends, such as a moist heat pack or a heating pad.  Place a towel between your skin and the heat source.  Leave the heat on for 20–30 minutes.  Remove the heat if your skin turns bright red. This is especially important if you are unable to feel pain, heat, or cold. You may have a greater risk of getting burned.  Medicines     Take over-the-counter and prescription medicines only as told by your health care provider.  Do not drive or use heavy machinery while taking prescription pain medicine.  Contact a health care provider if:  Your muscle pain gets worse and medicines do not help.  You have muscle pain that lasts longer than 3 days.  You have a rash or fever along with muscle pain.  You have muscle pain after a tick bite.  You have muscle pain while working out, even though you are in good physical condition.  You have redness, soreness, or swelling along with muscle pain.  You have muscle pain after starting a new medicine or changing the dose of a medicine.  Get help right away if:  You have trouble breathing.  You have trouble swallowing.  You have muscle pain along with a stiff neck, fever, and vomiting.  You have severe muscle weakness or cannot move part of your body.  This information is not intended to replace advice given to you by your health care provider. Make sure you discuss any questions you have with your health care provider.

## 2019-07-19 NOTE — ED ADULT TRIAGE NOTE - CHIEF COMPLAINT QUOTE
Patient c/o abdominal pain; patient states "it feels like someone is ripping me apart when I move a certain way or bend down and pick something up". Patient c/o nausea, denies vomiting or diarrhea. Hx. stomach ulcers, hyperthyroidism, anxiety. Patient reports getting a call back from Urgent care "concerning an aneurysm or something with the aorta". Patient tearful in triage.

## 2019-07-20 VITALS
RESPIRATION RATE: 18 BRPM | OXYGEN SATURATION: 100 % | SYSTOLIC BLOOD PRESSURE: 119 MMHG | DIASTOLIC BLOOD PRESSURE: 75 MMHG | TEMPERATURE: 97 F | HEART RATE: 64 BPM

## 2019-07-20 PROCEDURE — 71046 X-RAY EXAM CHEST 2 VIEWS: CPT | Mod: 26

## 2019-07-20 RX ORDER — ACETAMINOPHEN 500 MG
975 TABLET ORAL ONCE
Refills: 0 | Status: COMPLETED | OUTPATIENT
Start: 2019-07-20 | End: 2019-07-20

## 2019-07-20 RX ORDER — IBUPROFEN 200 MG
600 TABLET ORAL ONCE
Refills: 0 | Status: COMPLETED | OUTPATIENT
Start: 2019-07-20 | End: 2019-07-20

## 2019-07-20 RX ADMIN — Medication 975 MILLIGRAM(S): at 02:13

## 2019-07-20 RX ADMIN — Medication 600 MILLIGRAM(S): at 02:14

## 2020-01-11 ENCOUNTER — RESULT REVIEW (OUTPATIENT)
Age: 35
End: 2020-01-11

## 2020-01-21 ENCOUNTER — EMERGENCY (EMERGENCY)
Facility: HOSPITAL | Age: 35
LOS: 1 days | Discharge: LEFT BEFORE DISCHARGE | End: 2020-01-21
Attending: EMERGENCY MEDICINE
Payer: COMMERCIAL

## 2020-01-21 ENCOUNTER — EMERGENCY (EMERGENCY)
Facility: HOSPITAL | Age: 35
LOS: 1 days | Discharge: LEFT BEFORE DISCHARGE | End: 2020-01-21
Admitting: EMERGENCY MEDICINE

## 2020-01-21 VITALS
RESPIRATION RATE: 18 BRPM | DIASTOLIC BLOOD PRESSURE: 73 MMHG | WEIGHT: 229.94 LBS | HEART RATE: 64 BPM | OXYGEN SATURATION: 100 % | HEIGHT: 65 IN | TEMPERATURE: 97 F | SYSTOLIC BLOOD PRESSURE: 122 MMHG

## 2020-01-21 VITALS
OXYGEN SATURATION: 100 % | HEART RATE: 71 BPM | TEMPERATURE: 98 F | SYSTOLIC BLOOD PRESSURE: 127 MMHG | DIASTOLIC BLOOD PRESSURE: 68 MMHG | RESPIRATION RATE: 15 BRPM

## 2020-01-21 PROCEDURE — 99281 EMR DPT VST MAYX REQ PHY/QHP: CPT

## 2020-01-21 PROCEDURE — L9991: CPT

## 2020-01-21 NOTE — ED ADULT TRIAGE NOTE - CHIEF COMPLAINT QUOTE
Pt c/o approx 7 weeks pregnant with vaginal bleeding that started today.  Now c/o lower back pain.  Denies dysuria but endorsing increased urinary symptoms.  PMHx:  thyroid

## 2020-01-22 NOTE — ED ADULT NURSE NOTE - EXPLANATION OF PATIENT'S REASON FOR LEAVING
states can't wait any longer, refused blood test , states will follow up with [facility] in am. Dr Mccain made aware.

## 2020-03-13 NOTE — ED ADULT NURSE NOTE - NS ED NURSE DISCH DISPOSITION
PROGRESS NOTE    Patient Presents with/Seen in Consultation For      *CBD stricture with pneumobilia  CHIEF COMPLAINT:  Epigastric pain    Subjective:     Patient in OR. Chart/labs reviewed. --> Back to see pt, he is now in room. Pt states he is feeling ok, reports post op pain with movement 3/10. Reports he is passing flatus. Review of Systems  Aside from what was mentioned in the PMH and HPI, essentially unremarkable, all others negative. Objective:     BP (!) 184/78   Pulse 81   Temp 98.3 °F (36.8 °C)   Resp 14   Ht 5' 4\" (1.626 m)   Wt 165 lb (74.8 kg)   SpO2 100%   BMI 28.32 kg/m²     Deferred, pt in OR--> Back to see patient, he is in room. General appearance: alert, awake, laying in bed, and cooperative  Eyes: conjunctiva pink, sclera anicteric. PERRL.   Lungs: diminished with fine exp wz to auscultation bilaterally  Heart: regular rate and rhythm, no murmur, 2+ pulses; trace BLE edema  Abdomen: softly distended, lap incisions well approximated withotu drainage or ecchymosis, appropriate post op tenderness; bowel sounds hypoactive; no masses,  no organomegaly  Extremities: extremities with trace BLE edema  Pulses: 2+ and symmetric  Skin: Skin color, texture, turgor normal.   Neurologic: Grossly normal    fentaNYL (SUBLIMAZE) injection 50 mcg, Q5 Min PRN  HYDROmorphone (DILAUDID) injection 0.25 mg, Q5 Min PRN  HYDROmorphone (DILAUDID) injection 0.5 mg, Q5 Min PRN  oxyCODONE-acetaminophen (PERCOCET) 5-325 MG per tablet 1 tablet, Once PRN  promethazine (PHENERGAN) injection 6.25 mg, PRN  meperidine (DEMEROL) injection 12.5 mg, Q10 Min PRN  bupivacaine-EPINEPHrine PF (MARCAINE-w/EPINEPHrine) 0.25% -1:[id number] injection, PRN  potassium chloride 10 mEq/100 mL IVPB (Peripheral Line), Q1H  HYDROmorphone (DILAUDID) injection 0.25 mg, Q5 Min PRN  HYDROmorphone (DILAUDID) injection 0.5 mg, Q5 Min PRN  pantoprazole (PROTONIX) injection 40 mg, Daily    And  sodium chloride (PF) 0.9 % injection 10 mL, Daily  indomethacin (INDOCIN) 50 MG suppository 100 mg, 60 Min Pre-Op  sodium chloride flush 0.9 % injection 10 mL, 2 times per day  sodium chloride flush 0.9 % injection 10 mL, PRN  acetaminophen (TYLENOL) tablet 650 mg, Q6H PRN    Or  acetaminophen (TYLENOL) suppository 650 mg, Q6H PRN  polyethylene glycol (GLYCOLAX) packet 17 g, Daily PRN  promethazine (PHENERGAN) tablet 12.5 mg, Q6H PRN    Or  ondansetron (ZOFRAN) injection 4 mg, Q6H PRN  enoxaparin (LOVENOX) injection 40 mg, Daily  nicotine (NICODERM CQ) 14 MG/24HR 1 patch, Daily  dextrose 5 % and 0.9 % sodium chloride infusion, Continuous  piperacillin-tazobactam (ZOSYN) 3.375 g in dextrose 5 % 50 mL IVPB extended infusion (mini-bag), Q8H    And  0.9 % sodium chloride infusion, Q8H         Data Review  CBC:   Lab Results   Component Value Date    WBC 9.3 03/13/2020    RBC 4.07 03/13/2020    HGB 12.1 03/13/2020    HCT 37.7 03/13/2020    MCV 92.6 03/13/2020    MCH 29.7 03/13/2020    MCHC 32.1 03/13/2020    RDW 13.9 03/13/2020     03/13/2020    MPV 12.8 03/13/2020     CMP:    Lab Results   Component Value Date     03/13/2020    K 3.2 03/13/2020     03/13/2020    CO2 24 03/13/2020    BUN 5 03/13/2020    CREATININE 0.7 03/13/2020    GFRAA >60 03/13/2020    LABGLOM >60 03/13/2020    GLUCOSE 98 03/13/2020    PROT 5.2 03/13/2020    LABALBU 3.0 03/13/2020    CALCIUM 8.9 03/13/2020    BILITOT 0.3 03/13/2020    ALKPHOS 115 03/13/2020    AST 31 03/13/2020    ALT 25 03/13/2020     Hepatic Function Panel:    Lab Results   Component Value Date    ALKPHOS 115 03/13/2020    ALT 25 03/13/2020    AST 31 03/13/2020    PROT 5.2 03/13/2020    BILITOT 0.3 03/13/2020    BILIDIR <0.2 03/13/2020    IBILI see below 03/13/2020    LABALBU 3.0 03/13/2020       PT/INR:    Lab Results   Component Value Date    PROTIME 11.8 03/13/2020    INR 1.0 03/13/2020         Assessment:     Active Problems:  · 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 A Wallstent was placed more for surgical identification during the anticipated much needed cholecystectomy. Papilla appeared avulsed with a copious amount of pus and gravel and sludge draining from it. Balloon sweeping resulted in excess of gravel and sludge, however, no stones were recovered. Of note, large ulcer on top of the papilla that will be treated medically. Plan:     · Continue IV fluids per PCP  · Monica today, pt in OR  · Continue Pantoprazole 40 mg BID (increased) - large ulcer  · Continue antibiotics as ordered  · Monitor CMP, CBC, Amylase, and Lipase  · Will need stent removal 3 weeks post monica, Dr Joseph Denton office to schedule  · Continue to monitor    Discussed with Dr. Arianna Long per Dr. Gloria PLEITEZ-ACNS-BC, FNP-BC 3/13/2020 9:27 AM For Dr. Bear Valentin. AS ABOVE. FOR STENT REMOVAL IN 3 WEEKS, MY OFFICE TO ARRANGE. Left without being seen (saw a nurse but never saw a physician or midlevel provider)

## 2020-08-10 ENCOUNTER — APPOINTMENT (OUTPATIENT)
Dept: OBGYN | Facility: CLINIC | Age: 35
End: 2020-08-10
Payer: COMMERCIAL

## 2020-08-10 PROCEDURE — 36415 COLL VENOUS BLD VENIPUNCTURE: CPT

## 2020-08-10 PROCEDURE — 81025 URINE PREGNANCY TEST: CPT

## 2020-08-10 PROCEDURE — 99385 PREV VISIT NEW AGE 18-39: CPT

## 2020-08-11 ENCOUNTER — TRANSCRIPTION ENCOUNTER (OUTPATIENT)
Age: 35
End: 2020-08-11

## 2020-08-24 ENCOUNTER — APPOINTMENT (OUTPATIENT)
Dept: ANTEPARTUM | Facility: CLINIC | Age: 35
End: 2020-08-24
Payer: COMMERCIAL

## 2020-08-24 PROCEDURE — 76817 TRANSVAGINAL US OBSTETRIC: CPT

## 2020-09-14 ENCOUNTER — APPOINTMENT (OUTPATIENT)
Dept: ANTEPARTUM | Facility: CLINIC | Age: 35
End: 2020-09-14
Payer: COMMERCIAL

## 2020-09-14 PROCEDURE — 0502F SUBSEQUENT PRENATAL CARE: CPT

## 2020-09-28 ENCOUNTER — APPOINTMENT (OUTPATIENT)
Dept: ANTEPARTUM | Facility: CLINIC | Age: 35
End: 2020-09-28
Payer: COMMERCIAL

## 2020-09-28 PROCEDURE — 76813 OB US NUCHAL MEAS 1 GEST: CPT

## 2020-09-28 PROCEDURE — 76816 OB US FOLLOW-UP PER FETUS: CPT

## 2020-10-26 ENCOUNTER — APPOINTMENT (OUTPATIENT)
Dept: OBGYN | Facility: CLINIC | Age: 35
End: 2020-10-26
Payer: COMMERCIAL

## 2020-10-26 PROCEDURE — 0502F SUBSEQUENT PRENATAL CARE: CPT

## 2020-11-23 ENCOUNTER — APPOINTMENT (OUTPATIENT)
Dept: ANTEPARTUM | Facility: CLINIC | Age: 35
End: 2020-11-23
Payer: COMMERCIAL

## 2020-11-23 PROCEDURE — 76811 OB US DETAILED SNGL FETUS: CPT

## 2020-11-23 PROCEDURE — 76817 TRANSVAGINAL US OBSTETRIC: CPT

## 2020-11-30 ENCOUNTER — APPOINTMENT (OUTPATIENT)
Dept: ANTEPARTUM | Facility: CLINIC | Age: 35
End: 2020-11-30
Payer: COMMERCIAL

## 2020-11-30 PROCEDURE — 99072 ADDL SUPL MATRL&STAF TM PHE: CPT

## 2020-11-30 PROCEDURE — 76816 OB US FOLLOW-UP PER FETUS: CPT

## 2020-12-28 ENCOUNTER — APPOINTMENT (OUTPATIENT)
Dept: OBGYN | Facility: CLINIC | Age: 35
End: 2020-12-28
Payer: COMMERCIAL

## 2020-12-28 PROCEDURE — 0502F SUBSEQUENT PRENATAL CARE: CPT

## 2021-01-20 ENCOUNTER — APPOINTMENT (OUTPATIENT)
Dept: OBGYN | Facility: CLINIC | Age: 36
End: 2021-01-20
Payer: COMMERCIAL

## 2021-01-20 PROCEDURE — 0502F SUBSEQUENT PRENATAL CARE: CPT

## 2021-02-03 ENCOUNTER — APPOINTMENT (OUTPATIENT)
Dept: ANTEPARTUM | Facility: CLINIC | Age: 36
End: 2021-02-03

## 2021-02-08 ENCOUNTER — APPOINTMENT (OUTPATIENT)
Dept: OBGYN | Facility: CLINIC | Age: 36
End: 2021-02-08
Payer: COMMERCIAL

## 2021-02-08 ENCOUNTER — APPOINTMENT (OUTPATIENT)
Dept: ANTEPARTUM | Facility: CLINIC | Age: 36
End: 2021-02-08
Payer: COMMERCIAL

## 2021-02-08 PROCEDURE — 76816 OB US FOLLOW-UP PER FETUS: CPT

## 2021-02-08 PROCEDURE — 0502F SUBSEQUENT PRENATAL CARE: CPT

## 2021-02-08 PROCEDURE — 99072 ADDL SUPL MATRL&STAF TM PHE: CPT

## 2021-02-08 PROCEDURE — 76819 FETAL BIOPHYS PROFIL W/O NST: CPT

## 2021-02-22 ENCOUNTER — APPOINTMENT (OUTPATIENT)
Dept: OBGYN | Facility: CLINIC | Age: 36
End: 2021-02-22
Payer: COMMERCIAL

## 2021-02-22 PROCEDURE — 0502F SUBSEQUENT PRENATAL CARE: CPT

## 2021-03-01 ENCOUNTER — APPOINTMENT (OUTPATIENT)
Dept: OBGYN | Facility: CLINIC | Age: 36
End: 2021-03-01
Payer: COMMERCIAL

## 2021-03-01 PROCEDURE — 0502F SUBSEQUENT PRENATAL CARE: CPT

## 2021-03-15 ENCOUNTER — APPOINTMENT (OUTPATIENT)
Dept: OBGYN | Facility: CLINIC | Age: 36
End: 2021-03-15
Payer: COMMERCIAL

## 2021-03-15 PROCEDURE — 0502F SUBSEQUENT PRENATAL CARE: CPT

## 2021-03-22 ENCOUNTER — APPOINTMENT (OUTPATIENT)
Dept: OBGYN | Facility: CLINIC | Age: 36
End: 2021-03-22
Payer: COMMERCIAL

## 2021-03-22 PROCEDURE — 0502F SUBSEQUENT PRENATAL CARE: CPT

## 2021-03-29 ENCOUNTER — APPOINTMENT (OUTPATIENT)
Dept: ANTEPARTUM | Facility: CLINIC | Age: 36
End: 2021-03-29
Payer: COMMERCIAL

## 2021-03-29 PROCEDURE — 76819 FETAL BIOPHYS PROFIL W/O NST: CPT

## 2021-03-29 PROCEDURE — 76816 OB US FOLLOW-UP PER FETUS: CPT

## 2021-03-29 PROCEDURE — 99072 ADDL SUPL MATRL&STAF TM PHE: CPT

## 2021-03-29 PROCEDURE — 76820 UMBILICAL ARTERY ECHO: CPT

## 2021-04-02 ENCOUNTER — OUTPATIENT (OUTPATIENT)
Dept: INPATIENT UNIT | Facility: HOSPITAL | Age: 36
LOS: 1 days | Discharge: HOME | End: 2021-04-02
Payer: COMMERCIAL

## 2021-04-02 VITALS — DIASTOLIC BLOOD PRESSURE: 60 MMHG | SYSTOLIC BLOOD PRESSURE: 116 MMHG | HEART RATE: 63 BPM

## 2021-04-02 VITALS
RESPIRATION RATE: 16 BRPM | HEART RATE: 82 BPM | DIASTOLIC BLOOD PRESSURE: 81 MMHG | TEMPERATURE: 98 F | SYSTOLIC BLOOD PRESSURE: 117 MMHG

## 2021-04-02 DIAGNOSIS — O26.899 OTHER SPECIFIED PREGNANCY RELATED CONDITIONS, UNSPECIFIED TRIMESTER: ICD-10-CM

## 2021-04-02 DIAGNOSIS — Z3A.00 WEEKS OF GESTATION OF PREGNANCY NOT SPECIFIED: ICD-10-CM

## 2021-04-02 PROCEDURE — 99214 OFFICE O/P EST MOD 30 MIN: CPT

## 2021-04-02 NOTE — OB PROVIDER TRIAGE NOTE - ADDITIONAL INSTRUCTIONS
Plan:  patient is cleared for discharge with labor precautions.  ROM also discussed.  To f/u with Dr Huang on Monday, as scheduled.

## 2021-04-02 NOTE — OB PROVIDER TRIAGE NOTE - HISTORY OF PRESENT ILLNESS
PNC Provider:  Dr Huang  EDC:  4/8/2021    `Patient is a 34 y/o G 6 P 2032 @ 39 1/7wks gestation who reports to triage with c/o contraction q 5-6min since this afternoon.  Denies LOF or vaginal bleeding.  Reports good fetal movements.    AP Course:  uncomplicated  Meds - iron & pnv  NKDA

## 2021-04-02 NOTE — OB PROVIDER TRIAGE NOTE - NSHPPHYSICALEXAM_GEN_ALL_CORE
Vital Signs Last 24 Hrs  T(C): 36.7 (02 Apr 2021 16:54), Max: 36.7 (02 Apr 2021 16:54)  T(F): 98.1 (02 Apr 2021 16:54), Max: 98.1 (02 Apr 2021 16:54)  HR: 82 (02 Apr 2021 17:08) (82 - 82)  BP: 117/81 (02 Apr 2021 17:08) (117/81 - 117/81)  RR: 16 (02 Apr 2021 16:54) (16 - 16)    Abdomen gravid, soft and nontender  NST -  SVE - 2/40/-3 Intact  Cephalic presentation Vital Signs Last 24 Hrs  T(C): 36.7 (02 Apr 2021 16:54), Max: 36.7 (02 Apr 2021 16:54)  T(F): 98.1 (02 Apr 2021 16:54), Max: 98.1 (02 Apr 2021 16:54)  HR: 82 (02 Apr 2021 17:08) (82 - 82)  BP: 117/81 (02 Apr 2021 17:08) (117/81 - 117/81)  RR: 16 (02 Apr 2021 16:54) (16 - 16)    Abdomen gravid, soft and nontender  NST - cat1  SVE - 2/40/-3 Intact  Cephalic presentation

## 2021-04-02 NOTE — OB PROVIDER TRIAGE NOTE - NSOBPROVIDERNOTE_OBGYN_ALL_OB_FT
PNC Provider:  Dr Huang  EDC:  2021    `Patient is a 36 y/o G 6 P  @ 39 1/7wks gestation who reports to triage with c/o contraction q 5-6min since this afternoon.  Denies LOF or vaginal bleeding.  Reports good fetal movements.    AP Course:  uncomplicated  Meds - iron & pnv  NKDA    PMH/PSH/GYN/SH - denies  OB   - 2015 - 6lbs 3oz   - 2017 - 9lbs 5oz  sab x 3; 1st trimester  Psych - anxiety    Vital Signs Last 24 Hrs  T(C): 36.7 (2021 16:54), Max: 36.7 (2021 16:54)  T(F): 98.1 (2021 16:54), Max: 98.1 (2021 16:54)  HR: 82 (2021 17:08) (82 - 82)  BP: 117/81 (2021 17:08) (117/81 - 117/81)  RR: 16 (2021 16:54) (16 - 16)    Abdomen gravid, soft and nontender  NST -  SVE - 2/40/-3 Intact  Cephalic presentation  Next appt is on 21.    Discussed patient with   Plan: PNC Provider:  Dr Huang  EDC:  2021    `Patient is a 34 y/o G 6 P  @ 39 1/7wks gestation who reports to triage with c/o contraction q 5-6min since this afternoon.  Denies LOF or vaginal bleeding.  Reports good fetal movements.    AP Course:  uncomplicated  Meds - iron & pnv  NKDA    PMH/PSH/GYN/SH - denies  OB   - 2015 - 6lbs 3oz   - 2017 - 9lbs 5oz  sab x 3; 1st trimester  Psych - anxiety    Vital Signs Last 24 Hrs  T(C): 36.7 (2021 16:54), Max: 36.7 (2021 16:54)  T(F): 98.1 (2021 16:54), Max: 98.1 (2021 16:54)  HR: 82 (2021 17:08) (82 - 82)  BP: 117/81 (2021 17:08) (117/81 - 117/81)  RR: 16 (2021 16:54) (16 - 16)    Abdomen gravid, soft and nontender  NST - CAT1 FHT  SVE - 2/40/-3 Intact  Cephalic presentation  No evidence of labor  at this time  Next appt is on 21.    Discussed patient with Dr. Huang  Plan:  patient is cleared for discharge with labor precautions.  ROM also discussed.  To f/u with Dr Huang on Monday, as scheduled. PNC Provider:  Dr Huang  EDC:  2021    `Patient is a 34 y/o G 6 P  @ 39 1/7wks gestation who reports to triage with c/o contraction q 5-6min since this afternoon.  Denies LOF or vaginal bleeding.  Reports good fetal movements.    AP Course:  uncomplicated  Meds - iron & pnv  NKDA    PSH/GYN/SH - denies  PMH - Hypertyroidism    OB   - 2015 - 6lbs 3oz   - 2017 - 9lbs 5oz  sab x 3; 1st trimester  Psych - anxiety    Vital Signs Last 24 Hrs  T(C): 36.7 (2021 16:54), Max: 36.7 (2021 16:54)  T(F): 98.1 (2021 16:54), Max: 98.1 (2021 16:54)  HR: 82 (2021 17:08) (82 - 82)  BP: 117/81 (2021 17:08) (117/81 - 117/81)  RR: 16 (2021 16:54) (16 - 16)    Abdomen gravid, soft and nontender  NST - CAT1 FHT  SVE - 2/40/-3 Intact  Cephalic presentation  No evidence of labor  at this time  Next appt is on 21.    Discussed patient with Dr. Huang  Plan:  patient is cleared for discharge with labor precautions.  ROM also discussed.  To f/u with Dr Huang on Monday, as scheduled.

## 2021-04-05 ENCOUNTER — APPOINTMENT (OUTPATIENT)
Dept: OBGYN | Facility: CLINIC | Age: 36
End: 2021-04-05
Payer: COMMERCIAL

## 2021-04-05 PROCEDURE — 0502F SUBSEQUENT PRENATAL CARE: CPT

## 2021-04-08 ENCOUNTER — APPOINTMENT (OUTPATIENT)
Dept: ANTEPARTUM | Facility: CLINIC | Age: 36
End: 2021-04-08
Payer: COMMERCIAL

## 2021-04-08 PROCEDURE — 0502F SUBSEQUENT PRENATAL CARE: CPT

## 2021-04-09 ENCOUNTER — INPATIENT (INPATIENT)
Facility: HOSPITAL | Age: 36
LOS: 0 days | Discharge: HOME | End: 2021-04-10
Attending: OBSTETRICS & GYNECOLOGY | Admitting: OBSTETRICS & GYNECOLOGY
Payer: COMMERCIAL

## 2021-04-09 ENCOUNTER — TRANSCRIPTION ENCOUNTER (OUTPATIENT)
Age: 36
End: 2021-04-09

## 2021-04-09 VITALS
DIASTOLIC BLOOD PRESSURE: 65 MMHG | RESPIRATION RATE: 17 BRPM | TEMPERATURE: 98 F | HEART RATE: 79 BPM | SYSTOLIC BLOOD PRESSURE: 111 MMHG

## 2021-04-09 DIAGNOSIS — O42.90 PREMATURE RUPTURE OF MEMBRANES, UNSPECIFIED AS TO LENGTH OF TIME BETWEEN RUPTURE AND ONSET OF LABOR, UNSPECIFIED WEEKS OF GESTATION: ICD-10-CM

## 2021-04-09 DIAGNOSIS — O26.899 OTHER SPECIFIED PREGNANCY RELATED CONDITIONS, UNSPECIFIED TRIMESTER: ICD-10-CM

## 2021-04-09 DIAGNOSIS — Z3A.00 WEEKS OF GESTATION OF PREGNANCY NOT SPECIFIED: ICD-10-CM

## 2021-04-09 LAB
BASOPHILS # BLD AUTO: 0.04 K/UL — SIGNIFICANT CHANGE UP (ref 0–0.2)
BASOPHILS NFR BLD AUTO: 0.3 % — SIGNIFICANT CHANGE UP (ref 0–2)
BLD GP AB SCN SERPL QL: NEGATIVE — SIGNIFICANT CHANGE UP
COVID-19 SPIKE DOMAIN AB INTERP: NEGATIVE — SIGNIFICANT CHANGE UP
COVID-19 SPIKE DOMAIN ANTIBODY RESULT: 0.4 U/ML — SIGNIFICANT CHANGE UP
EOSINOPHIL # BLD AUTO: 0.04 K/UL — SIGNIFICANT CHANGE UP (ref 0–0.5)
EOSINOPHIL NFR BLD AUTO: 0.3 % — SIGNIFICANT CHANGE UP (ref 0–6)
HCT VFR BLD CALC: 36 % — SIGNIFICANT CHANGE UP (ref 34.5–45)
HGB BLD-MCNC: 11.6 G/DL — SIGNIFICANT CHANGE UP (ref 11.5–15.5)
IANC: 9.26 K/UL — HIGH (ref 1.5–8.5)
IMM GRANULOCYTES NFR BLD AUTO: 0.8 % — SIGNIFICANT CHANGE UP (ref 0–1.5)
LYMPHOCYTES # BLD AUTO: 17.2 % — SIGNIFICANT CHANGE UP (ref 13–44)
LYMPHOCYTES # BLD AUTO: 2.11 K/UL — SIGNIFICANT CHANGE UP (ref 1–3.3)
MCHC RBC-ENTMCNC: 26.7 PG — LOW (ref 27–34)
MCHC RBC-ENTMCNC: 32.2 GM/DL — SIGNIFICANT CHANGE UP (ref 32–36)
MCV RBC AUTO: 82.8 FL — SIGNIFICANT CHANGE UP (ref 80–100)
MONOCYTES # BLD AUTO: 0.75 K/UL — SIGNIFICANT CHANGE UP (ref 0–0.9)
MONOCYTES NFR BLD AUTO: 6.1 % — SIGNIFICANT CHANGE UP (ref 2–14)
NEUTROPHILS # BLD AUTO: 9.26 K/UL — HIGH (ref 1.8–7.4)
NEUTROPHILS NFR BLD AUTO: 75.3 % — SIGNIFICANT CHANGE UP (ref 43–77)
NRBC # BLD: 0 /100 WBCS — SIGNIFICANT CHANGE UP
NRBC # FLD: 0 K/UL — SIGNIFICANT CHANGE UP
PLATELET # BLD AUTO: 325 K/UL — SIGNIFICANT CHANGE UP (ref 150–400)
RBC # BLD: 4.35 M/UL — SIGNIFICANT CHANGE UP (ref 3.8–5.2)
RBC # FLD: 16 % — HIGH (ref 10.3–14.5)
RH IG SCN BLD-IMP: POSITIVE — SIGNIFICANT CHANGE UP
SARS-COV-2 IGG+IGM SERPL QL IA: 0.4 U/ML — SIGNIFICANT CHANGE UP
SARS-COV-2 IGG+IGM SERPL QL IA: NEGATIVE — SIGNIFICANT CHANGE UP
SARS-COV-2 RNA SPEC QL NAA+PROBE: SIGNIFICANT CHANGE UP
T PALLIDUM AB TITR SER: NEGATIVE — SIGNIFICANT CHANGE UP
WBC # BLD: 12.3 K/UL — HIGH (ref 3.8–10.5)
WBC # FLD AUTO: 12.3 K/UL — HIGH (ref 3.8–10.5)

## 2021-04-09 PROCEDURE — 59400 OBSTETRICAL CARE: CPT

## 2021-04-09 RX ORDER — AMPICILLIN TRIHYDRATE 250 MG
1 CAPSULE ORAL EVERY 4 HOURS
Refills: 0 | Status: DISCONTINUED | OUTPATIENT
Start: 2021-04-09 | End: 2021-04-09

## 2021-04-09 RX ORDER — AER TRAVELER 0.5 G/1
1 SOLUTION RECTAL; TOPICAL EVERY 4 HOURS
Refills: 0 | Status: DISCONTINUED | OUTPATIENT
Start: 2021-04-09 | End: 2021-04-10

## 2021-04-09 RX ORDER — SODIUM CHLORIDE 9 MG/ML
3 INJECTION INTRAMUSCULAR; INTRAVENOUS; SUBCUTANEOUS EVERY 8 HOURS
Refills: 0 | Status: DISCONTINUED | OUTPATIENT
Start: 2021-04-09 | End: 2021-04-10

## 2021-04-09 RX ORDER — OXYTOCIN 10 UNIT/ML
333.33 VIAL (ML) INJECTION
Qty: 20 | Refills: 0 | Status: DISCONTINUED | OUTPATIENT
Start: 2021-04-09 | End: 2021-04-10

## 2021-04-09 RX ORDER — TETANUS TOXOID, REDUCED DIPHTHERIA TOXOID AND ACELLULAR PERTUSSIS VACCINE, ADSORBED 5; 2.5; 8; 8; 2.5 [IU]/.5ML; [IU]/.5ML; UG/.5ML; UG/.5ML; UG/.5ML
0.5 SUSPENSION INTRAMUSCULAR ONCE
Refills: 0 | Status: DISCONTINUED | OUTPATIENT
Start: 2021-04-09 | End: 2021-04-10

## 2021-04-09 RX ORDER — HYDROCORTISONE 1 %
1 OINTMENT (GRAM) TOPICAL EVERY 6 HOURS
Refills: 0 | Status: DISCONTINUED | OUTPATIENT
Start: 2021-04-09 | End: 2021-04-10

## 2021-04-09 RX ORDER — IBUPROFEN 200 MG
600 TABLET ORAL EVERY 6 HOURS
Refills: 0 | Status: DISCONTINUED | OUTPATIENT
Start: 2021-04-09 | End: 2021-04-10

## 2021-04-09 RX ORDER — PRAMOXINE HYDROCHLORIDE 150 MG/15G
1 AEROSOL, FOAM RECTAL EVERY 4 HOURS
Refills: 0 | Status: DISCONTINUED | OUTPATIENT
Start: 2021-04-09 | End: 2021-04-10

## 2021-04-09 RX ORDER — IBUPROFEN 200 MG
600 TABLET ORAL EVERY 6 HOURS
Refills: 0 | Status: COMPLETED | OUTPATIENT
Start: 2021-04-09 | End: 2022-03-08

## 2021-04-09 RX ORDER — BENZOCAINE 10 %
1 GEL (GRAM) MUCOUS MEMBRANE EVERY 6 HOURS
Refills: 0 | Status: DISCONTINUED | OUTPATIENT
Start: 2021-04-09 | End: 2021-04-10

## 2021-04-09 RX ORDER — LANOLIN
1 OINTMENT (GRAM) TOPICAL EVERY 6 HOURS
Refills: 0 | Status: DISCONTINUED | OUTPATIENT
Start: 2021-04-09 | End: 2021-04-10

## 2021-04-09 RX ORDER — AMPICILLIN TRIHYDRATE 250 MG
2 CAPSULE ORAL ONCE
Refills: 0 | Status: COMPLETED | OUTPATIENT
Start: 2021-04-09 | End: 2021-04-09

## 2021-04-09 RX ORDER — ACETAMINOPHEN 500 MG
975 TABLET ORAL
Refills: 0 | Status: DISCONTINUED | OUTPATIENT
Start: 2021-04-09 | End: 2021-04-10

## 2021-04-09 RX ORDER — SIMETHICONE 80 MG/1
80 TABLET, CHEWABLE ORAL EVERY 4 HOURS
Refills: 0 | Status: DISCONTINUED | OUTPATIENT
Start: 2021-04-09 | End: 2021-04-10

## 2021-04-09 RX ORDER — OXYCODONE HYDROCHLORIDE 5 MG/1
5 TABLET ORAL
Refills: 0 | Status: DISCONTINUED | OUTPATIENT
Start: 2021-04-09 | End: 2021-04-10

## 2021-04-09 RX ORDER — OXYTOCIN 10 UNIT/ML
VIAL (ML) INJECTION
Qty: 30 | Refills: 0 | Status: DISCONTINUED | OUTPATIENT
Start: 2021-04-09 | End: 2021-04-09

## 2021-04-09 RX ORDER — DIPHENHYDRAMINE HCL 50 MG
25 CAPSULE ORAL EVERY 6 HOURS
Refills: 0 | Status: DISCONTINUED | OUTPATIENT
Start: 2021-04-09 | End: 2021-04-10

## 2021-04-09 RX ORDER — SODIUM CHLORIDE 9 MG/ML
1000 INJECTION, SOLUTION INTRAVENOUS
Refills: 0 | Status: DISCONTINUED | OUTPATIENT
Start: 2021-04-09 | End: 2021-04-09

## 2021-04-09 RX ORDER — MAGNESIUM HYDROXIDE 400 MG/1
30 TABLET, CHEWABLE ORAL
Refills: 0 | Status: DISCONTINUED | OUTPATIENT
Start: 2021-04-09 | End: 2021-04-10

## 2021-04-09 RX ORDER — DIBUCAINE 1 %
1 OINTMENT (GRAM) RECTAL EVERY 6 HOURS
Refills: 0 | Status: DISCONTINUED | OUTPATIENT
Start: 2021-04-09 | End: 2021-04-10

## 2021-04-09 RX ORDER — KETOROLAC TROMETHAMINE 30 MG/ML
30 SYRINGE (ML) INJECTION ONCE
Refills: 0 | Status: DISCONTINUED | OUTPATIENT
Start: 2021-04-09 | End: 2021-04-10

## 2021-04-09 RX ORDER — OXYCODONE HYDROCHLORIDE 5 MG/1
5 TABLET ORAL ONCE
Refills: 0 | Status: DISCONTINUED | OUTPATIENT
Start: 2021-04-09 | End: 2021-04-10

## 2021-04-09 RX ADMIN — Medication 600 MILLIGRAM(S): at 21:05

## 2021-04-09 RX ADMIN — Medication 600 MILLIGRAM(S): at 13:58

## 2021-04-09 RX ADMIN — SODIUM CHLORIDE 3 MILLILITER(S): 9 INJECTION INTRAMUSCULAR; INTRAVENOUS; SUBCUTANEOUS at 14:13

## 2021-04-09 RX ADMIN — SODIUM CHLORIDE 3 MILLILITER(S): 9 INJECTION INTRAMUSCULAR; INTRAVENOUS; SUBCUTANEOUS at 21:11

## 2021-04-09 RX ADMIN — Medication 600 MILLIGRAM(S): at 14:32

## 2021-04-09 RX ADMIN — Medication 2 MILLIUNIT(S)/MIN: at 06:46

## 2021-04-09 RX ADMIN — Medication 216 GRAM(S): at 05:35

## 2021-04-09 RX ADMIN — SODIUM CHLORIDE 125 MILLILITER(S): 9 INJECTION, SOLUTION INTRAVENOUS at 05:24

## 2021-04-09 RX ADMIN — Medication 600 MILLIGRAM(S): at 20:35

## 2021-04-09 NOTE — OB PROVIDER TRIAGE NOTE - NSOBPROVIDERNOTE_OBGYN_ALL_OB_FT
Evidence of rupture of membranes     d/w Dr. Beach     -Admit to L&D for IOL with pitocin for SROM   -routine orders placed   -ampicillin for gbs positive   -declines epidural at this time

## 2021-04-09 NOTE — OB PROVIDER DELIVERY SUMMARY - NSPROVIDERDELIVERYNOTE_OBGYN_ALL_OB_FT
Spontaneous vaginal delivery of liveborn infant from SAV position. Head, shoulders and body delivered without complication. Infant was suctioned, no mec. Cord was clamped and cut and infant was passed to mother. Placenta was delivered intact with a 3 vessel cord. Fundal massage was given and uterus was found to be firm. Vaginal exam revealed an intact cervix, vaginal walls and sulci. Patient had small first degree laceration in the perineum that was hemostatic and did not require stitches. Excellent hemostasis was noted. The patient was stable and went to recovery. Count was correct x2.

## 2021-04-09 NOTE — OB PROVIDER H&P - HISTORY OF PRESENT ILLNESS
36 yo  40 1/7 weeks with complaints of leaking of fluid since 2 am, clear fluid. Reports fetal movement, irregular contractions. Denies vaginal bleeding.    Current a/p complications: Denies     Allergies: NKDA  Medications: PNV  PMH: hx of hyperthyroidism, hx of stomach ulcer    PSH: Denies  OB/GYN:  ft  uncomplicated 6#3   2017 ft  uncomplicated 9#5   sab x 2 complete   Social: Denies   Psychosocial: Denies

## 2021-04-09 NOTE — DISCHARGE NOTE OB - CARE PROVIDER_API CALL
Randolph Huang)  MaternalFetal Medicine; Obstetrics and Gynecology  [address] [place] [id number]  Phone: [phone]  Fax: [phone]  Follow Up Time:

## 2021-04-09 NOTE — OB PROVIDER TRIAGE NOTE - HISTORY OF PRESENT ILLNESS
36 yo  40 1/7 weeks with complaints of leaking of fluid since 2 am, clear fluid. Reports fetal movement, irregular contractions. Denies vaginal bleeding.    Current a/p complications: Denies     Allergies: NKDA  Medications: PNV  PMH: hx of hyperthyroidism, hx of stomach ulcer    PSH: Denies  OB/GYN:  36 yo  40 1/7 weeks with complaints of leaking of fluid since 2 am, clear fluid. Reports fetal movement, irregular contractions. Denies vaginal bleeding.    Current a/p complications: Denies     Allergies: NKDA  Medications: PNV  PMH: hx of hyperthyroidism, hx of stomach ulcer    PSH: Denies  OB/GYN:  ft  uncomplicated 6#3   2017 ft  uncomplicated 9#5   sab x 2 complete   Social: Denies   Psychosocial: Denies

## 2021-04-09 NOTE — DISCHARGE NOTE OB - CARE PLAN
Principal Discharge DX:	 (normal spontaneous vaginal delivery)  Goal:	recovery  Assessment and plan of treatment:	pelvic rest  follow up 6 weeks

## 2021-04-09 NOTE — OB RN DELIVERY SUMMARY - AS DELIV COMPLICATIONS OB
none [place]  pt c/o feeling warm at night  sometimes with light sweat  no cough,  fever,  GI sxs, wt loss, SOB or chest pain  sxs have been ongoing for 2 mos  Pt has never taken her temperature at time of subjective warmth  Pt saw PMD for this  give z pac  exam  WDWN  NAD  clear lungs  Card S1S2 soft murmur at base  abd  nontender  no palpable mass or  organomegaly  no stiff joints  no cervical or axillary nodes felt  thyroin nonpalpable [place]  pt c/o feeling warm at night  sometimes with light sweat  no cough,  fever,  GI sxs, wt loss, SOB or chest pain  sxs have been ongoing for 2 mos  Pt has never taken her temperature at time of subjective warmth  Pt saw PMD for this  give z pac  exam  WDWN  NAD  clear lungs  Card S1S2 soft murmur at base  abd  nontender  no palpable mass or  organomegaly  no stiff joints  no cervical or axillary nodes felt  thyroid nonpalpable

## 2021-04-09 NOTE — DISCHARGE NOTE OB - BREAST MILK IS MORE DIGESTIBLE, MAKING VOMITING, DIARRHEA, GAS AND CONSTIPATION LESS COMMON
Dash diet information left at  for patient for . Patient notified. Patient will come in, in the morning for .    Statement Selected

## 2021-04-09 NOTE — CHART NOTE - NSCHARTNOTEFT_GEN_A_CORE
PA Note    patient seen & examined for minimal relief from epidural     VS  T(C): 36.7 (04-09-21 @ 04:13)  HR: 86 (04-09-21 @ 06:52)  BP: 140/65 (04-09-21 @ 06:52)  RR: 14 (04-09-21 @ 04:13)  SpO2: 90% (04-09-21 @ 06:47)    EFM  130/mod bailey/+accels/no decels  [place] q 3-4min  VE 6/90/-1    cont efm/toco  epidural reinforcement  dw Dr Yong lamb

## 2021-04-09 NOTE — OB PROVIDER TRIAGE NOTE - NSHPPHYSICALEXAM_GEN_ALL_CORE
Vital Signs Last 24 Hrs  T(C): 36.7 (09 Apr 2021 04:13), Max: 36.7 (09 Apr 2021 04:13)  T(F): 98.1 (09 Apr 2021 04:13), Max: 98.1 (09 Apr 2021 04:13)  HR: 65 (09 Apr 2021 05:02) (65 - 92)  BP: 124/64 (09 Apr 2021 04:14) (111/65 - 124/64)  RR: 14 (09 Apr 2021 04:13) (14 - 17)  SpO2: 94% (09 Apr 2021 05:02) (90% - 100%)    Abdomen soft, nontender   lungs ctab   heart r/r/r    SSE cervix appears dilated, positive pooling clear mucus discharge, nitrazine equivocal, fern positive    SVE 3.5/80/-3 cephalic     Category 1 tracing, , moderate variability, +accels, no decels   irregular contractions by toco      clinical efw 3317 g

## 2021-04-09 NOTE — OB RN DELIVERY SUMMARY - NSSELHIDDEN_OBGYN_ALL_OB_FT
[NS_DeliveryAttending1_OBGYN_ALL_OB_FT:[email or web address]],[NS_DeliveryRN_OBGYN_ALL_OB_FT:[id number]=]

## 2021-04-09 NOTE — DISCHARGE NOTE OB - PATIENT PORTAL LINK FT
You can access the FollowMyHealth Patient Portal offered by [facility] by registering at the following website: [email or web address]. By joining [employer] - is now [employer]’s FollowMyHealth portal, you will also be able to view your health information using other applications (apps) compatible with our system.

## 2021-04-09 NOTE — OB RN DELIVERY SUMMARY - NS_SEPSISRSKCALC_OBGYN_ALL_OB_FT
EOS calculated successfully. EOS Risk Factor: 0.5/1000 live births ([facility] national incidence); GA=40w1d; Temp=98.1; ROM=5.217; GBS='Positive'; Antibiotics='GBS specific antibiotics > 2 hrs prior to birth'

## 2021-04-09 NOTE — OB PROVIDER H&P - NSNYCREQUIREMENTS_OBGYN_ALL_OB
Hi Miller,  Hemoglobin A1c was 6.6.  This indicates excellent control and is consistent with the blood sugar testing that you have been doing.  ALT normal, no proteinuria, LDL 68, normal thyroid function, slightly low hematocrit than prior values --I have added CBC to check on your platelet levels  With Best Regards,   Jennifer Wilcox MD  Endocrinology Service.  
KAILEE

## 2021-04-10 VITALS
HEART RATE: 70 BPM | OXYGEN SATURATION: 100 % | SYSTOLIC BLOOD PRESSURE: 112 MMHG | RESPIRATION RATE: 19 BRPM | DIASTOLIC BLOOD PRESSURE: 70 MMHG | TEMPERATURE: 98 F

## 2021-04-10 RX ORDER — ACETAMINOPHEN 500 MG
3 TABLET ORAL
Qty: 0 | Refills: 0 | DISCHARGE
Start: 2021-04-10

## 2021-04-10 RX ORDER — IBUPROFEN 200 MG
1 TABLET ORAL
Qty: 0 | Refills: 0 | DISCHARGE
Start: 2021-04-10

## 2021-04-10 RX ADMIN — SODIUM CHLORIDE 3 MILLILITER(S): 9 INJECTION INTRAMUSCULAR; INTRAVENOUS; SUBCUTANEOUS at 06:18

## 2021-04-10 RX ADMIN — Medication 600 MILLIGRAM(S): at 09:30

## 2021-04-10 RX ADMIN — Medication 600 MILLIGRAM(S): at 08:55

## 2021-04-11 ENCOUNTER — NON-APPOINTMENT (OUTPATIENT)
Age: 36
End: 2021-04-11

## 2021-04-12 ENCOUNTER — APPOINTMENT (OUTPATIENT)
Dept: ANTEPARTUM | Facility: CLINIC | Age: 36
End: 2021-04-12

## 2021-04-12 ENCOUNTER — NON-APPOINTMENT (OUTPATIENT)
Age: 36
End: 2021-04-12

## 2021-05-26 ENCOUNTER — APPOINTMENT (OUTPATIENT)
Dept: OBGYN | Facility: CLINIC | Age: 36
End: 2021-05-26
Payer: COMMERCIAL

## 2021-05-26 PROCEDURE — 0503F POSTPARTUM CARE VISIT: CPT

## 2021-10-12 NOTE — OB RN PATIENT PROFILE - NS PRO DEPRESSION SCREENING Y/N6
Met with patient and wife in patient's room. Both patient and wife agreeable to patient coming to ARU once medically cleared. All questions answered. Patient still needs echo completed. Updated unit  Davis Bagley. no

## 2022-07-29 NOTE — OB PROVIDER TRIAGE NOTE - ABORTIONS, OB PROFILE
3

## 2022-10-06 ENCOUNTER — APPOINTMENT (OUTPATIENT)
Dept: OBGYN | Facility: CLINIC | Age: 37
End: 2022-10-06

## 2022-10-06 VITALS — DIASTOLIC BLOOD PRESSURE: 80 MMHG | SYSTOLIC BLOOD PRESSURE: 136 MMHG | WEIGHT: 245 LBS

## 2022-10-06 DIAGNOSIS — M54.50 LOW BACK PAIN, UNSPECIFIED: ICD-10-CM

## 2022-10-06 DIAGNOSIS — N89.8 OTHER SPECIFIED NONINFLAMMATORY DISORDERS OF VAGINA: ICD-10-CM

## 2022-10-06 DIAGNOSIS — Z86.39 PERSONAL HISTORY OF OTHER ENDOCRINE, NUTRITIONAL AND METABOLIC DISEASE: ICD-10-CM

## 2022-10-06 DIAGNOSIS — Z83.3 FAMILY HISTORY OF DIABETES MELLITUS: ICD-10-CM

## 2022-10-06 DIAGNOSIS — F12.90 CANNABIS USE, UNSPECIFIED, UNCOMPLICATED: ICD-10-CM

## 2022-10-06 DIAGNOSIS — Z01.419 ENCOUNTER FOR GYNECOLOGICAL EXAMINATION (GENERAL) (ROUTINE) W/OUT ABNORMAL FINDINGS: ICD-10-CM

## 2022-10-06 PROCEDURE — 36415 COLL VENOUS BLD VENIPUNCTURE: CPT

## 2022-10-06 PROCEDURE — 99395 PREV VISIT EST AGE 18-39: CPT

## 2022-10-06 NOTE — HISTORY OF PRESENT ILLNESS
[N] : Patient reports normal menses [Vasectomy (partner)] : has a partner with a vasectomy [Monogamous] : is monogamous [Y] : Positive pregnancy history [LMPDate] : 08/27/2022 [MensesFreq] : 30 [MensesLength] : 5 [PGHxTotal] : 5 [[facility]] : 3 [PGHxPremature] : 0 [PGHxAbortions] : 2 [[facility] Centeriving] : 3 [PGHxABSpont] : 2 [FreeTextEntry1] : NSVDx3  [Currently Active] : currently active [Men] : men [Vaginal] : vaginal [No] : No

## 2022-10-06 NOTE — PHYSICAL EXAM
Sheath #1: Sheath: inserted. vein.  Right fistula [Chaperone Present] : A chaperone was present in the examining room during all aspects of the physical examination [FreeTextEntry1] : Yadi  [Appropriately responsive] : appropriately responsive [Alert] : alert [No Acute Distress] : no acute distress [No Lymphadenopathy] : no lymphadenopathy [Soft] : soft [Non-tender] : non-tender [Non-distended] : non-distended [No HSM] : No HSM [No Lesions] : no lesions [No Mass] : no mass [Oriented x3] : oriented x3 [Examination Of The Breasts] : a normal appearance [No Masses] : no breast masses were palpable [Labia Majora] : normal [Labia Minora] : normal [Discharge] : a  ~M vaginal discharge was present [Moderate] : moderate [White] : white [Thick] : thick [Normal] : normal [Uterine Adnexae] : normal

## 2022-10-06 NOTE — PLAN
[FreeTextEntry1] : 37 year  y/o P3 presenting for annual exam\par -f/u pap and GC/CT\par -BD affirm \par -Contraception: vasectomy, advised to have her partner f/u with urology \par -urine hcg test in office negative, discussed possible MAB. no IUP seen on sonogram.\par -hcg drawn  \par -f/u PRN\par

## 2022-10-07 LAB
C TRACH RRNA SPEC QL NAA+PROBE: NOT DETECTED
CANDIDA VAG CYTO: NOT DETECTED
G VAGINALIS+PREV SP MTYP VAG QL MICRO: NOT DETECTED
HCG SERPL-MCNC: <1 MIU/ML
HPV 16 E6+E7 MRNA CVX QL NAA+PROBE: NOT DETECTED
HPV HIGH+LOW RISK DNA PNL CVX: NOT DETECTED
HPV18+45 E6+E7 MRNA CVX QL NAA+PROBE: NOT DETECTED
N GONORRHOEA RRNA SPEC QL NAA+PROBE: NOT DETECTED
SOURCE AMPLIFICATION: NORMAL
T VAGINALIS VAG QL WET PREP: NOT DETECTED

## 2022-10-11 LAB — CYTOLOGY CVX/VAG DOC THIN PREP: NORMAL

## 2022-10-20 ENCOUNTER — APPOINTMENT (OUTPATIENT)
Dept: OBGYN | Facility: CLINIC | Age: 37
End: 2022-10-20

## 2022-10-20 ENCOUNTER — APPOINTMENT (OUTPATIENT)
Dept: ANTEPARTUM | Facility: CLINIC | Age: 37
End: 2022-10-20

## 2022-10-20 PROCEDURE — 76830 TRANSVAGINAL US NON-OB: CPT

## 2022-10-20 PROCEDURE — 76857 US EXAM PELVIC LIMITED: CPT | Mod: 59

## 2022-10-20 PROCEDURE — 99214 OFFICE O/P EST MOD 30 MIN: CPT

## 2022-10-20 PROCEDURE — 36415 COLL VENOUS BLD VENIPUNCTURE: CPT

## 2022-10-20 NOTE — PLAN
[FreeTextEntry1] : GYN sonogram showed thickened endo, and possible/ questionable ovarian ectopic, see report for further information. results/images reviewed with Dr NAVARRO, who does not believe it is ovarian ectopic due to lack of free fluid and flow to the area. \par [place] [facility] [place] urine pregnancy test today was negative. \par reviewed with patient if the HCG is negative Dr Navarro is not worried and does not believe it is an ovarian ectopic., if hcg is + will discuss options. \par

## 2022-10-20 NOTE — PROCEDURE
[Abnormal Uterine Bleeding] : abnormal uterine bleeding [Transvaginal Ultrasound] : transvaginal ultrasound [FreeTextEntry4] : possible questionable ovarian ectopic

## 2022-10-20 NOTE — HISTORY OF PRESENT ILLNESS
[FreeTextEntry1] : 37 yr old was seen in office on 10/06/2022, after aborted MRI due to report possible pregnancy. Patient had then missed her period with negative urine pregnancy at home and negative in our office on 10/06/2022. BHCG from 10/6/2022 was also negative. Patient reports she started bleeding a few days after the visit and has been bleeding since.  [Abnormal Quantity] : abnormal quantity [___ Days] : [unfilled] days [Moderate Bleeding] : moderate bleeding [Currently Active] : currently active [Men] : men [Vaginal] : vaginal [No] : No

## 2022-10-22 LAB
ALBUMIN SERPL ELPH-MCNC: 4.1 G/DL
ALP BLD-CCNC: 105 U/L
ALT SERPL-CCNC: 10 U/L
ANION GAP SERPL CALC-SCNC: 11 MMOL/L
AST SERPL-CCNC: 15 U/L
BASOPHILS # BLD AUTO: 0.07 K/UL
BASOPHILS NFR BLD AUTO: 0.7 %
BILIRUB SERPL-MCNC: <0.2 MG/DL
BUN SERPL-MCNC: 10 MG/DL
CALCIUM SERPL-MCNC: 9 MG/DL
CHLORIDE SERPL-SCNC: 103 MMOL/L
CO2 SERPL-SCNC: 27 MMOL/L
CREAT SERPL-MCNC: 0.57 MG/DL
DHEA-S SERPL-MCNC: 142 UG/DL
EGFR: 120 ML/MIN/1.73M2
EOSINOPHIL # BLD AUTO: 0.14 K/UL
EOSINOPHIL NFR BLD AUTO: 1.4 %
FSH SERPL-MCNC: 7.3 IU/L
GLUCOSE SERPL-MCNC: 96 MG/DL
HCT VFR BLD CALC: 38.5 %
HGB BLD-MCNC: 11.3 G/DL
IMM GRANULOCYTES NFR BLD AUTO: 0.3 %
LH SERPL-ACNC: 6.3 IU/L
LYMPHOCYTES # BLD AUTO: 2.54 K/UL
LYMPHOCYTES NFR BLD AUTO: 25.3 %
MAN DIFF?: NORMAL
MCHC RBC-ENTMCNC: 23.9 PG
MCHC RBC-ENTMCNC: 29.4 GM/DL
MCV RBC AUTO: 81.6 FL
MONOCYTES # BLD AUTO: 0.73 K/UL
MONOCYTES NFR BLD AUTO: 7.3 %
NEUTROPHILS # BLD AUTO: 6.52 K/UL
NEUTROPHILS NFR BLD AUTO: 65 %
PLATELET # BLD AUTO: 490 K/UL
POTASSIUM SERPL-SCNC: 4.3 MMOL/L
PROLACTIN SERPL-MCNC: 9.5 NG/ML
PROT SERPL-MCNC: 6.9 G/DL
RBC # BLD: 4.72 M/UL
RBC # FLD: 15.8 %
SODIUM SERPL-SCNC: 142 MMOL/L
T4 FREE SERPL-MCNC: 1.1 NG/DL
T4 SERPL-MCNC: 7.6 UG/DL
TESTOST SERPL-MCNC: 11.5 NG/DL
TSH SERPL-ACNC: 0.78 UIU/ML
WBC # FLD AUTO: 10.03 K/UL

## 2022-10-24 LAB
ESTIMATED AVERAGE GLUCOSE: 120 MG/DL
HBA1C MFR BLD HPLC: 5.8 %
HCG SERPL-MCNC: <1 MIU/ML

## 2022-11-08 ENCOUNTER — NON-APPOINTMENT (OUTPATIENT)
Age: 37
End: 2022-11-08

## 2022-11-16 ENCOUNTER — APPOINTMENT (OUTPATIENT)
Dept: ANTEPARTUM | Facility: CLINIC | Age: 37
End: 2022-11-16
Payer: COMMERCIAL

## 2022-11-16 ENCOUNTER — APPOINTMENT (OUTPATIENT)
Dept: OBGYN | Facility: CLINIC | Age: 37
End: 2022-11-16

## 2022-11-16 VITALS
SYSTOLIC BLOOD PRESSURE: 122 MMHG | WEIGHT: 248 LBS | BODY MASS INDEX: 41.32 KG/M2 | HEIGHT: 65 IN | DIASTOLIC BLOOD PRESSURE: 75 MMHG

## 2022-11-16 PROCEDURE — 76857 US EXAM PELVIC LIMITED: CPT | Mod: 59

## 2022-11-16 PROCEDURE — 99214 OFFICE O/P EST MOD 30 MIN: CPT

## 2022-11-16 PROCEDURE — 76830 TRANSVAGINAL US NON-OB: CPT

## 2022-11-18 NOTE — HISTORY OF PRESENT ILLNESS
[Currently Active] : currently active [Men] : men [Vaginal] : vaginal [No] : No [FreeTextEntry1] : 36 yo female presents today for a gyn sono. Patient had an MRI in August that they stopped doing because they told the patient they saw a sac in the uterus. She reports an LMP 11/5/22 and states that her period was very heavy at that time with a lot of clots. Patient denies any vaginal bleeding or abdominal cramping. [FreeTextEntry3] : vasectomy

## 2022-11-18 NOTE — PLAN
[FreeTextEntry1] : 38 yo female for gyn sono to r/o pregnancy:\par -gyn sono shows possible uterine polyp\par -Recommended saline sono to confirm diagnosis\par -f/u for saline sono

## 2022-12-19 ENCOUNTER — APPOINTMENT (OUTPATIENT)
Dept: ANTEPARTUM | Facility: CLINIC | Age: 37
End: 2022-12-19

## 2022-12-19 ENCOUNTER — APPOINTMENT (OUTPATIENT)
Dept: OBGYN | Facility: CLINIC | Age: 37
End: 2022-12-19

## 2022-12-19 VITALS
DIASTOLIC BLOOD PRESSURE: 76 MMHG | BODY MASS INDEX: 42.15 KG/M2 | WEIGHT: 253.31 LBS | SYSTOLIC BLOOD PRESSURE: 118 MMHG

## 2022-12-19 PROCEDURE — ZZZZZ: CPT

## 2022-12-19 PROCEDURE — 76831 ECHO EXAM UTERUS: CPT

## 2022-12-19 PROCEDURE — 58340 CATHETER FOR HYSTEROGRAPHY: CPT

## 2022-12-19 NOTE — ASSESSMENT
[FreeTextEntry1] : 37 year old F presenting for saline hysterosonogram due to polyp\par -saline infusion sonohysterography performed without complication (please see procedure note for full details)\par -patient advised that she may have cramping for the next few hours, but that this is normal\par -Referral given to Dr.Uchenna Jose Alberto Moore for possible polypectomy \par -f/u PRN

## 2023-01-31 ENCOUNTER — APPOINTMENT (OUTPATIENT)
Dept: OBGYN | Facility: CLINIC | Age: 38
End: 2023-01-31
Payer: COMMERCIAL

## 2023-01-31 VITALS
BODY MASS INDEX: 42.15 KG/M2 | HEART RATE: 51 BPM | HEIGHT: 65 IN | DIASTOLIC BLOOD PRESSURE: 81 MMHG | WEIGHT: 253 LBS | SYSTOLIC BLOOD PRESSURE: 136 MMHG

## 2023-01-31 DIAGNOSIS — E66.9 OBESITY, UNSPECIFIED: ICD-10-CM

## 2023-01-31 DIAGNOSIS — N93.9 ABNORMAL UTERINE AND VAGINAL BLEEDING, UNSPECIFIED: ICD-10-CM

## 2023-01-31 PROCEDURE — 99213 OFFICE O/P EST LOW 20 MIN: CPT

## 2023-01-31 RX ORDER — GLUC/MSM/COLGN2/HYAL/ANTIARTH3 375-375-20
TABLET ORAL
Refills: 0 | Status: ACTIVE | COMMUNITY

## 2023-01-31 NOTE — DISCUSSION/SUMMARY
[FreeTextEntry1] : 36 yo P3 w/AUB and SHG suggestive of EM polyp.\par Spoke to pt about procedure and possible findings.\par Offered pt option for Mirena placed at same time, based on her risk factors for recurrence and future hyperplasia. Pt not interested.\par \par Plan\par Schedule hsc polyp w/Myosure\par \par Letter to Dr. Huang

## 2023-03-22 ENCOUNTER — APPOINTMENT (OUTPATIENT)
Dept: OBGYN | Facility: CLINIC | Age: 38
End: 2023-03-22

## 2023-03-27 ENCOUNTER — OUTPATIENT (OUTPATIENT)
Dept: OUTPATIENT SERVICES | Facility: HOSPITAL | Age: 38
LOS: 1 days | End: 2023-03-27

## 2023-03-27 VITALS
OXYGEN SATURATION: 99 % | DIASTOLIC BLOOD PRESSURE: 85 MMHG | RESPIRATION RATE: 16 BRPM | HEART RATE: 80 BPM | WEIGHT: 250 LBS | SYSTOLIC BLOOD PRESSURE: 121 MMHG | HEIGHT: 64 IN | TEMPERATURE: 98 F

## 2023-03-27 DIAGNOSIS — Z98.890 OTHER SPECIFIED POSTPROCEDURAL STATES: Chronic | ICD-10-CM

## 2023-03-27 DIAGNOSIS — N93.9 ABNORMAL UTERINE AND VAGINAL BLEEDING, UNSPECIFIED: ICD-10-CM

## 2023-03-27 DIAGNOSIS — E66.01 MORBID (SEVERE) OBESITY DUE TO EXCESS CALORIES: ICD-10-CM

## 2023-03-27 LAB
HCG SERPL-ACNC: <5 MIU/ML — SIGNIFICANT CHANGE UP
HCT VFR BLD CALC: 38.2 % — SIGNIFICANT CHANGE UP (ref 34.5–45)
HGB BLD-MCNC: 11.8 G/DL — SIGNIFICANT CHANGE UP (ref 11.5–15.5)
MCHC RBC-ENTMCNC: 25.5 PG — LOW (ref 27–34)
MCHC RBC-ENTMCNC: 30.9 GM/DL — LOW (ref 32–36)
MCV RBC AUTO: 82.5 FL — SIGNIFICANT CHANGE UP (ref 80–100)
NRBC # BLD: 0 /100 WBCS — SIGNIFICANT CHANGE UP (ref 0–0)
NRBC # FLD: 0 K/UL — SIGNIFICANT CHANGE UP (ref 0–0)
PLATELET # BLD AUTO: 350 K/UL — SIGNIFICANT CHANGE UP (ref 150–400)
RBC # BLD: 4.63 M/UL — SIGNIFICANT CHANGE UP (ref 3.8–5.2)
RBC # FLD: 18.3 % — HIGH (ref 10.3–14.5)
WBC # BLD: 12.69 K/UL — HIGH (ref 3.8–10.5)
WBC # FLD AUTO: 12.69 K/UL — HIGH (ref 3.8–10.5)

## 2023-03-27 RX ORDER — IBUPROFEN 200 MG
1 TABLET ORAL
Refills: 0 | DISCHARGE

## 2023-03-27 NOTE — H&P PST ADULT - PROBLEM SELECTOR PLAN 1
Scheduled for hysteroscopic polypectomy with myosure  Written & verbal preop instructions, gi prophylaxis.  Pt verbalized good understanding.  cbc, hcg done

## 2023-03-27 NOTE — H&P PST ADULT - NSICDXPASTMEDICALHX_GEN_ALL_CORE_FT
PAST MEDICAL HISTORY:  Abnormal uterine and vaginal bleeding, unspecified     Hyperthyroidism     Marijuana user     Morbid obesity      (normal spontaneous vaginal delivery)   6-3  2017  9-5    Stomach ulcer

## 2023-03-27 NOTE — H&P PST ADULT - HISTORY OF PRESENT ILLNESS
38y/o female presents for preop eval for scheduled hysteroscopic polypectomy with myosure.  Pt with c/o excessive, irregular menstruation.  GYN eval & imaging done.  States dx with uterine polyp.

## 2023-04-06 ENCOUNTER — TRANSCRIPTION ENCOUNTER (OUTPATIENT)
Age: 38
End: 2023-04-06

## 2023-04-06 NOTE — ASU PATIENT PROFILE, ADULT - FALL HARM RISK - UNIVERSAL INTERVENTIONS
Bed in lowest position, wheels locked, appropriate side rails in place/Call bell, personal items and telephone in reach/Instruct patient to call for assistance before getting out of bed or chair/Non-slip footwear when patient is out of bed/[place] to call system/Physically safe environment - no spills, clutter or unnecessary equipment/Purposeful Proactive Rounding/Room/bathroom lighting operational, light cord in reach

## 2023-04-07 ENCOUNTER — APPOINTMENT (OUTPATIENT)
Dept: OBGYN | Facility: HOSPITAL | Age: 38
End: 2023-04-07

## 2023-04-07 ENCOUNTER — RESULT REVIEW (OUTPATIENT)
Age: 38
End: 2023-04-07

## 2023-04-07 ENCOUNTER — TRANSCRIPTION ENCOUNTER (OUTPATIENT)
Age: 38
End: 2023-04-07

## 2023-04-07 ENCOUNTER — OUTPATIENT (OUTPATIENT)
Dept: OUTPATIENT SERVICES | Facility: HOSPITAL | Age: 38
LOS: 1 days | Discharge: HOME | End: 2023-04-07
Payer: COMMERCIAL

## 2023-04-07 VITALS
OXYGEN SATURATION: 98 % | SYSTOLIC BLOOD PRESSURE: 120 MMHG | RESPIRATION RATE: 16 BRPM | HEART RATE: 67 BPM | DIASTOLIC BLOOD PRESSURE: 58 MMHG | TEMPERATURE: 97 F

## 2023-04-07 VITALS
SYSTOLIC BLOOD PRESSURE: 126 MMHG | WEIGHT: 250 LBS | RESPIRATION RATE: 18 BRPM | HEIGHT: 64 IN | DIASTOLIC BLOOD PRESSURE: 82 MMHG | TEMPERATURE: 98 F | OXYGEN SATURATION: 100 % | HEART RATE: 66 BPM

## 2023-04-07 DIAGNOSIS — N93.9 ABNORMAL UTERINE AND VAGINAL BLEEDING, UNSPECIFIED: ICD-10-CM

## 2023-04-07 DIAGNOSIS — Z98.890 OTHER SPECIFIED POSTPROCEDURAL STATES: Chronic | ICD-10-CM

## 2023-04-07 LAB — HCG UR QL: NEGATIVE — SIGNIFICANT CHANGE UP

## 2023-04-07 PROCEDURE — 88305 TISSUE EXAM BY PATHOLOGIST: CPT | Mod: 26

## 2023-04-07 PROCEDURE — 58558 HYSTEROSCOPY BIOPSY: CPT

## 2023-04-07 DEVICE — MYOSURE TISSUE REMOVAL DEVICE XL: Type: IMPLANTABLE DEVICE | Status: FUNCTIONAL

## 2023-04-07 RX ORDER — ONDANSETRON 8 MG/1
4 TABLET, FILM COATED ORAL ONCE
Refills: 0 | Status: DISCONTINUED | OUTPATIENT
Start: 2023-04-07 | End: 2023-04-21

## 2023-04-07 RX ORDER — LIDOCAINE 4 G/100G
1 CREAM TOPICAL
Refills: 0 | DISCHARGE

## 2023-04-07 RX ORDER — HYDROMORPHONE HYDROCHLORIDE 2 MG/ML
0.5 INJECTION INTRAMUSCULAR; INTRAVENOUS; SUBCUTANEOUS
Refills: 0 | Status: DISCONTINUED | OUTPATIENT
Start: 2023-04-07 | End: 2023-04-07

## 2023-04-07 RX ORDER — IBUPROFEN 200 MG
1 TABLET ORAL
Refills: 0 | DISCHARGE

## 2023-04-07 RX ORDER — HYDROMORPHONE HYDROCHLORIDE 2 MG/ML
1 INJECTION INTRAMUSCULAR; INTRAVENOUS; SUBCUTANEOUS
Refills: 0 | Status: DISCONTINUED | OUTPATIENT
Start: 2023-04-07 | End: 2023-04-07

## 2023-04-07 NOTE — BRIEF OPERATIVE NOTE - NSICDXBRIEFPROCEDURE_GEN_ALL_CORE_FT
PROCEDURES:  Exam under anesthesia, pelvic 07-Apr-2023 08:53:28  Lauro Flores  Hysteroscopy, with dilation and curettage of uterus and polypectomy or uterine myomectomy using MyoSure tissue removal system 07-Apr-2023 08:53:46  Lauro Flores

## 2023-04-07 NOTE — ASU DISCHARGE PLAN (ADULT/PEDIATRIC) - CARE PROVIDER_API CALL
Fred Moore (MD)  Obstetrics and Gynecology  [address] [address] [place] [address]  Phone: [phone]  Fax: [phone]  Established Patient  Follow Up Time: Routine

## 2023-04-07 NOTE — ASU DISCHARGE PLAN (ADULT/PEDIATRIC) - CALL YOUR DOCTOR IF YOU HAVE ANY OF THE FOLLOWING:
Bleeding that does not stop/Pain not relieved by Medications/Fever greater than (need to indicate Fahrenheit or Celsius)/Wound/Surgical Site with redness, or foul smelling discharge or pus/Unable to urinate/Increased irritability or sluggishness

## 2023-04-07 NOTE — BRIEF OPERATIVE NOTE - OPERATION/FINDINGS
EUA w/ approximately 10-12wk size retroverted uterus. ~2-3cm polyp originating adjacent to the left ostia. Sharp curettage performed after resection w/ Myosure XL

## 2023-04-07 NOTE — ASU DISCHARGE PLAN (ADULT/PEDIATRIC) - NS MD DC FALL RISK RISK
For information on Fall & Injury Prevention, visit: [email or web address] OR  [email or web address] OR  https://www.cdc.gov/steadi/patient.html

## 2023-04-11 LAB — SURGICAL PATHOLOGY STUDY: SIGNIFICANT CHANGE UP

## 2023-05-18 ENCOUNTER — APPOINTMENT (OUTPATIENT)
Dept: OBGYN | Facility: CLINIC | Age: 38
End: 2023-05-18
Payer: COMMERCIAL

## 2023-05-18 VITALS
WEIGHT: 252 LBS | HEART RATE: 65 BPM | DIASTOLIC BLOOD PRESSURE: 77 MMHG | BODY MASS INDEX: 41.99 KG/M2 | HEIGHT: 65 IN | SYSTOLIC BLOOD PRESSURE: 132 MMHG

## 2023-05-18 DIAGNOSIS — N84.0 POLYP OF CORPUS UTERI: ICD-10-CM

## 2023-05-18 PROCEDURE — 99212 OFFICE O/P EST SF 10 MIN: CPT

## 2023-05-18 NOTE — DISCUSSION/SUMMARY
[FreeTextEntry1] : 36 yo P3 now s/p hsc polypectomy 4/7/23.\par Pt recovering well.\par Polyp benign.\par \par Plan\par Routine f/u w/Dr. Randolph Huang. \par Will send letter.

## 2023-05-18 NOTE — HISTORY OF PRESENT ILLNESS
[FreeTextEntry1] : 36 yo P3 here for PO check after hsc polypectomy 4/7/23.\par She has noticed occasional spotting since procedure.\par No pain.\par

## 2023-05-30 PROBLEM — N93.9 ABNORMAL UTERINE AND VAGINAL BLEEDING, UNSPECIFIED: Chronic | Status: ACTIVE | Noted: 2023-03-27

## 2023-05-30 PROBLEM — E66.01 MORBID (SEVERE) OBESITY DUE TO EXCESS CALORIES: Chronic | Status: ACTIVE | Noted: 2023-03-27

## 2023-05-30 PROBLEM — F12.90 CANNABIS USE, UNSPECIFIED, UNCOMPLICATED: Chronic | Status: ACTIVE | Noted: 2023-03-27

## 2023-06-05 ENCOUNTER — APPOINTMENT (OUTPATIENT)
Dept: ANTEPARTUM | Facility: CLINIC | Age: 38
End: 2023-06-05
Payer: COMMERCIAL

## 2023-06-05 ENCOUNTER — APPOINTMENT (OUTPATIENT)
Dept: OBGYN | Facility: CLINIC | Age: 38
End: 2023-06-05
Payer: COMMERCIAL

## 2023-06-05 VITALS — SYSTOLIC BLOOD PRESSURE: 130 MMHG | WEIGHT: 255 LBS | BODY MASS INDEX: 42.43 KG/M2 | DIASTOLIC BLOOD PRESSURE: 85 MMHG

## 2023-06-05 PROCEDURE — 76830 TRANSVAGINAL US NON-OB: CPT | Mod: 59

## 2023-06-05 PROCEDURE — 99214 OFFICE O/P EST MOD 30 MIN: CPT

## 2023-06-05 PROCEDURE — 76857 US EXAM PELVIC LIMITED: CPT | Mod: 59

## 2023-06-05 NOTE — HISTORY OF PRESENT ILLNESS
[FreeTextEntry1] : 37 yr old s/p polypectomy by outside office on 04/07/2023. Patient reports spotting after procedure and started bleeding 5/17/2023 and has been bleeding since then. Has been on and off bleeding, at times heavy bleeding with large blood clots.

## 2023-06-05 NOTE — PROCEDURE
[FreeTextEntry9] : s/p polypectomy  [FreeTextEntry4] : GYN sonogram WNL, thickened endo, see official sonogram for further information

## 2023-06-05 NOTE — PLAN
[FreeTextEntry1] : 37 yr old for U/S s/p polypectomy on 4/07/2023. patient has been bleeding on and off since 5/17/2023\par - GYN sonogram WNL, no evidence of polyp, thickened endo, however close to her next scheduled menses. see official sonogram for further information .\par - Dr Huang recommended Mirena IUD, or hormonal management. Patient declined due to h/o of mental health issues s/p Mirena IUD insertion and patient had to remove due to side effects. Dr Huang discussed possibility of using hormones for a short term \par - discussed if menses begin again heavy with clots would give a course of Lysteda, however discussed this is not a long term option.\par - discussed that lets see how her next cycle is and if returns to normal no further workup needed.

## 2023-06-14 NOTE — ASU PREOP CHECKLIST - IDENTIFICATION BAND VERIFIED
Complex Repair Preamble Text (Leave Blank If You Do Not Want): Extensive wide undermining was performed. done

## 2023-10-17 NOTE — BRIEF OPERATIVE NOTE - ANTIBIOTIC PROTOCOL
Visitors must remain in designated area assigned by the nursing staff and cannot eat in the facility.  The visitor may have a drink if it is covered with a lid or cap.  Please do not drink while care team members are in the room.  It will be optional for patient/support person to wear a mask during their stay. Masks are located at the entrance for anyone who would like to wear one.  
none indicated/Followed protocol

## 2024-01-10 NOTE — ED ADULT TRIAGE NOTE - MODE OF ARRIVAL
GYN ONC PROGRESS NOTE    Pt seen and examined at bedside. No events overnight. Pain well controlled. Biggs still in place- will d/c this AM. Patient ambulating. Passing flatus. Tolerating regular diet. Pt denies fever, chills, chest pain, SOB, nausea, vomiting, lightheadedness, dizziness.      Objective:  T(F): 98.2 (01-10-24 @ 05:58), Max: 98.9 (01-09-24 @ 22:13)  HR: 63 (01-10-24 @ 05:58) (56 - 80)  BP: 114/58 (01-10-24 @ 05:58) (108/61 - 144/67)  RR: 16 (01-10-24 @ 05:58) (12 - 19)  SpO2: 100% (01-10-24 @ 05:58) (95% - 100%)  Wt(kg): --  I&O's Summary    09 Jan 2024 07:01  -  10 Aquilino 2024 07:00  --------------------------------------------------------  IN: 2110 mL / OUT: 1020 mL / NET: 1090 mL      CAPILLARY BLOOD GLUCOSE      POCT Blood Glucose.: 122 mg/dL (09 Jan 2024 22:39)  POCT Blood Glucose.: 120 mg/dL (09 Jan 2024 16:01)      MEDICATIONS  (STANDING):  acetaminophen     Tablet .. 975 milliGRAM(s) Oral every 6 hours  chlorhexidine 2% Cloths 1 Application(s) Topical once  dextrose 5%. 1000 milliLiter(s) (50 mL/Hr) IV Continuous <Continuous>  dextrose 5%. 1000 milliLiter(s) (100 mL/Hr) IV Continuous <Continuous>  dextrose 50% Injectable 25 Gram(s) IV Push once  dextrose 50% Injectable 12.5 Gram(s) IV Push once  dextrose 50% Injectable 25 Gram(s) IV Push once  glucagon  Injectable 1 milliGRAM(s) IntraMuscular once  heparin   Injectable 5000 Unit(s) SubCutaneous every 8 hours  insulin lispro (ADMELOG) corrective regimen sliding scale   SubCutaneous at bedtime  insulin lispro (ADMELOG) corrective regimen sliding scale   SubCutaneous three times a day before meals  lactated ringers. 1000 milliLiter(s) (125 mL/Hr) IV Continuous <Continuous>  metoprolol tartrate Injectable 5 milliGRAM(s) IV Push every 6 hours    MEDICATIONS  (PRN):  dextrose Oral Gel 15 Gram(s) Oral once PRN Blood Glucose LESS THAN 70 milliGRAM(s)/deciliter  ibuprofen  Tablet. 600 milliGRAM(s) Oral every 6 hours PRN Mild Pain (1 - 3)  ondansetron    Tablet 8 milliGRAM(s) Oral every 8 hours PRN Nausea and/or Vomiting  senna 1 Tablet(s) Oral at bedtime PRN Constipation  simethicone 80 milliGRAM(s) Chew every 6 hours PRN Gas      Physical Exam:  Constitutional: NAD, A+O x3  CV: RR S1S2 no m/r/g  Lungs: CTA b/l, good air flow b/l  Abdomen: soft, softly-distended, appropriately-tender. no guarding, no rebound, normal bowel sounds  : Biggs in place with yellow/clear urine  Incision: port sites with overlaying op site bandages: clean, dry, intact  Extremities: no lower extremity edema or calf tenderness bilaterally; venodynes in place    LABS:               11.1   7.21  )-----------( 181      ( 01-10 @ 05:35 )             34.4                11.8   11.62 )-----------( 195      ( 01-09 @ 13:30 )             36.5       01-10    139    |  107    |  10     ----------------------------<  87     3.8     |  22     |  0.62   01-09    140    |  106    |  9      ----------------------------<  126<H>  4.1     |  23     |  0.58     Ca    8.3<L>      10 Aquilino 2024 05:35  Ca    8.7        09 Jan 2024 13:30            Urinalysis Basic - ( 10 Aquilino 2024 05:35 )    Color: x / Appearance: x / SG: x / pH: x  Gluc: 87 mg/dL / Ketone: x  / Bili: x / Urobili: x   Blood: x / Protein: x / Nitrite: x   Leuk Esterase: x / RBC: x / WBC x   Sq Epi: x / Non Sq Epi: x / Bacteria: x                 GYN ONC PROGRESS NOTE -- incomplete    Pt seen and examined at bedside. No events overnight. Pain well controlled. Biggs still in place- will d/c this AM. Patient ambulating. Passing flatus. Pt denies fever, chills, chest pain, SOB, nausea, vomiting, lightheadedness, dizziness.      Objective:  T(F): 98.2 (01-10-24 @ 05:58), Max: 98.9 (01-09-24 @ 22:13)  HR: 63 (01-10-24 @ 05:58) (56 - 80)  BP: 114/58 (01-10-24 @ 05:58) (108/61 - 144/67)  RR: 16 (01-10-24 @ 05:58) (12 - 19)  SpO2: 100% (01-10-24 @ 05:58) (95% - 100%)  Wt(kg): --  I&O's Summary    09 Jan 2024 07:01  -  10 Aquilino 2024 07:00  --------------------------------------------------------  IN: 2110 mL / OUT: 1020 mL / NET: 1090 mL      CAPILLARY BLOOD GLUCOSE      POCT Blood Glucose.: 122 mg/dL (09 Jan 2024 22:39)  POCT Blood Glucose.: 120 mg/dL (09 Jan 2024 16:01)      MEDICATIONS  (STANDING):  acetaminophen     Tablet .. 975 milliGRAM(s) Oral every 6 hours  chlorhexidine 2% Cloths 1 Application(s) Topical once  dextrose 5%. 1000 milliLiter(s) (50 mL/Hr) IV Continuous <Continuous>  dextrose 5%. 1000 milliLiter(s) (100 mL/Hr) IV Continuous <Continuous>  dextrose 50% Injectable 25 Gram(s) IV Push once  dextrose 50% Injectable 12.5 Gram(s) IV Push once  dextrose 50% Injectable 25 Gram(s) IV Push once  glucagon  Injectable 1 milliGRAM(s) IntraMuscular once  heparin   Injectable 5000 Unit(s) SubCutaneous every 8 hours  insulin lispro (ADMELOG) corrective regimen sliding scale   SubCutaneous at bedtime  insulin lispro (ADMELOG) corrective regimen sliding scale   SubCutaneous three times a day before meals  lactated ringers. 1000 milliLiter(s) (125 mL/Hr) IV Continuous <Continuous>  metoprolol tartrate Injectable 5 milliGRAM(s) IV Push every 6 hours    MEDICATIONS  (PRN):  dextrose Oral Gel 15 Gram(s) Oral once PRN Blood Glucose LESS THAN 70 milliGRAM(s)/deciliter  ibuprofen  Tablet. 600 milliGRAM(s) Oral every 6 hours PRN Mild Pain (1 - 3)  ondansetron    Tablet 8 milliGRAM(s) Oral every 8 hours PRN Nausea and/or Vomiting  senna 1 Tablet(s) Oral at bedtime PRN Constipation  simethicone 80 milliGRAM(s) Chew every 6 hours PRN Gas      Physical Exam:  Constitutional: NAD, A+O x3  CV: RR S1S2 no m/r/g  Lungs: CTA b/l, good air flow b/l  Abdomen: soft, softly-distended, appropriately-tender. no guarding, no rebound, normal bowel sounds  : Biggs in place with yellow/clear urine  Incision: port sites with overlaying op site bandages: clean, dry, intact  Extremities: no lower extremity edema or calf tenderness bilaterally; venodynes in place    LABS:               11.1   7.21  )-----------( 181      ( 01-10 @ 05:35 )             34.4                11.8   11.62 )-----------( 195      ( 01-09 @ 13:30 )             36.5       01-10    139    |  107    |  10     ----------------------------<  87     3.8     |  22     |  0.62   01-09    140    |  106    |  9      ----------------------------<  126<H>  4.1     |  23     |  0.58     Ca    8.3<L>      10 Aquilino 2024 05:35  Ca    8.7        09 Jan 2024 13:30            Urinalysis Basic - ( 10 Aquilino 2024 05:35 )    Color: x / Appearance: x / SG: x / pH: x  Gluc: 87 mg/dL / Ketone: x  / Bili: x / Urobili: x   Blood: x / Protein: x / Nitrite: x   Leuk Esterase: x / RBC: x / WBC x   Sq Epi: x / Non Sq Epi: x / Bacteria: x                 Private Vehicle GYN ONC PROGRESS NOTE     Pt seen and examined at bedside. No events overnight. Pain well controlled. Biggs still in place. Patient ambulating. Passing flatus. Pt tolerating light PO diet. Pt denies fever, chills, chest pain, SOB, nausea, vomiting, lightheadedness, dizziness.      Objective:  T(F): 98.2 (01-10-24 @ 05:58), Max: 98.9 (01-09-24 @ 22:13)  HR: 63 (01-10-24 @ 05:58) (56 - 80)  BP: 114/58 (01-10-24 @ 05:58) (108/61 - 144/67)  RR: 16 (01-10-24 @ 05:58) (12 - 19)  SpO2: 100% (01-10-24 @ 05:58) (95% - 100%)  Wt(kg): --  I&O's Summary    09 Jan 2024 07:01  -  10 Aquilino 2024 07:00  --------------------------------------------------------  IN: 2110 mL / OUT: 1020 mL / NET: 1090 mL      CAPILLARY BLOOD GLUCOSE      POCT Blood Glucose.: 122 mg/dL (09 Jan 2024 22:39)  POCT Blood Glucose.: 120 mg/dL (09 Jan 2024 16:01)      MEDICATIONS  (STANDING):  acetaminophen     Tablet .. 975 milliGRAM(s) Oral every 6 hours  chlorhexidine 2% Cloths 1 Application(s) Topical once  dextrose 5%. 1000 milliLiter(s) (50 mL/Hr) IV Continuous <Continuous>  dextrose 5%. 1000 milliLiter(s) (100 mL/Hr) IV Continuous <Continuous>  dextrose 50% Injectable 25 Gram(s) IV Push once  dextrose 50% Injectable 12.5 Gram(s) IV Push once  dextrose 50% Injectable 25 Gram(s) IV Push once  glucagon  Injectable 1 milliGRAM(s) IntraMuscular once  heparin   Injectable 5000 Unit(s) SubCutaneous every 8 hours  insulin lispro (ADMELOG) corrective regimen sliding scale   SubCutaneous at bedtime  insulin lispro (ADMELOG) corrective regimen sliding scale   SubCutaneous three times a day before meals  lactated ringers. 1000 milliLiter(s) (125 mL/Hr) IV Continuous <Continuous>  metoprolol tartrate Injectable 5 milliGRAM(s) IV Push every 6 hours    MEDICATIONS  (PRN):  dextrose Oral Gel 15 Gram(s) Oral once PRN Blood Glucose LESS THAN 70 milliGRAM(s)/deciliter  ibuprofen  Tablet. 600 milliGRAM(s) Oral every 6 hours PRN Mild Pain (1 - 3)  ondansetron    Tablet 8 milliGRAM(s) Oral every 8 hours PRN Nausea and/or Vomiting  senna 1 Tablet(s) Oral at bedtime PRN Constipation  simethicone 80 milliGRAM(s) Chew every 6 hours PRN Gas      Physical Exam:  Constitutional: NAD, A+O x3  CV: RR S1S2 no m/r/g  Lungs: CTA b/l, good air flow b/l  Abdomen: soft, softly-distended, appropriately-tender. no guarding, no rebound, normal bowel sounds  : Biggs in place with yellow/clear urine  Incision: port sites with overlaying op site bandages: clean, dry, intact  Extremities: no lower extremity edema or calf tenderness bilaterally; venodynes in place    LABS:               11.1   7.21  )-----------( 181      ( 01-10 @ 05:35 )             34.4                11.8   11.62 )-----------( 195      ( 01-09 @ 13:30 )             36.5       01-10    139    |  107    |  10     ----------------------------<  87     3.8     |  22     |  0.62   01-09    140    |  106    |  9      ----------------------------<  126<H>  4.1     |  23     |  0.58     Ca    8.3<L>      10 Aquilino 2024 05:35  Ca    8.7        09 Jan 2024 13:30            Urinalysis Basic - ( 10 Aquilino 2024 05:35 )    Color: x / Appearance: x / SG: x / pH: x  Gluc: 87 mg/dL / Ketone: x  / Bili: x / Urobili: x   Blood: x / Protein: x / Nitrite: x   Leuk Esterase: x / RBC: x / WBC x   Sq Epi: x / Non Sq Epi: x / Bacteria: x                 GYN ONC PROGRESS NOTE     Pt seen and examined at bedside. No events overnight. Pain well controlled. Biggs still in place. Patient not yet ambulating. Passing flatus. Pt tolerating light PO diet. Pt denies fever, chills, chest pain, SOB, nausea, vomiting, lightheadedness, dizziness.      Objective:  T(F): 98.2 (01-10-24 @ 05:58), Max: 98.9 (01-09-24 @ 22:13)  HR: 63 (01-10-24 @ 05:58) (56 - 80)  BP: 114/58 (01-10-24 @ 05:58) (108/61 - 144/67)  RR: 16 (01-10-24 @ 05:58) (12 - 19)  SpO2: 100% (01-10-24 @ 05:58) (95% - 100%)  Wt(kg): --  I&O's Summary    09 Jan 2024 07:01  -  10 Aquilino 2024 07:00  --------------------------------------------------------  IN: 2110 mL / OUT: 1020 mL / NET: 1090 mL      CAPILLARY BLOOD GLUCOSE      POCT Blood Glucose.: 122 mg/dL (09 Jan 2024 22:39)  POCT Blood Glucose.: 120 mg/dL (09 Jan 2024 16:01)      MEDICATIONS  (STANDING):  acetaminophen     Tablet .. 975 milliGRAM(s) Oral every 6 hours  chlorhexidine 2% Cloths 1 Application(s) Topical once  dextrose 5%. 1000 milliLiter(s) (50 mL/Hr) IV Continuous <Continuous>  dextrose 5%. 1000 milliLiter(s) (100 mL/Hr) IV Continuous <Continuous>  dextrose 50% Injectable 25 Gram(s) IV Push once  dextrose 50% Injectable 12.5 Gram(s) IV Push once  dextrose 50% Injectable 25 Gram(s) IV Push once  glucagon  Injectable 1 milliGRAM(s) IntraMuscular once  heparin   Injectable 5000 Unit(s) SubCutaneous every 8 hours  insulin lispro (ADMELOG) corrective regimen sliding scale   SubCutaneous at bedtime  insulin lispro (ADMELOG) corrective regimen sliding scale   SubCutaneous three times a day before meals  lactated ringers. 1000 milliLiter(s) (125 mL/Hr) IV Continuous <Continuous>  metoprolol tartrate Injectable 5 milliGRAM(s) IV Push every 6 hours    MEDICATIONS  (PRN):  dextrose Oral Gel 15 Gram(s) Oral once PRN Blood Glucose LESS THAN 70 milliGRAM(s)/deciliter  ibuprofen  Tablet. 600 milliGRAM(s) Oral every 6 hours PRN Mild Pain (1 - 3)  ondansetron    Tablet 8 milliGRAM(s) Oral every 8 hours PRN Nausea and/or Vomiting  senna 1 Tablet(s) Oral at bedtime PRN Constipation  simethicone 80 milliGRAM(s) Chew every 6 hours PRN Gas      Physical Exam:  Constitutional: NAD, A+O x3  CV: RR S1S2 no m/r/g  Lungs: CTA b/l, good air flow b/l  Abdomen: soft, softly-distended, appropriately-tender. no guarding, no rebound, normal bowel sounds  : Biggs in place with yellow/clear urine  Incision: port sites with overlaying op site bandages: clean, dry, intact  Extremities: no lower extremity edema or calf tenderness bilaterally; venodynes in place    LABS:               11.1   7.21  )-----------( 181      ( 01-10 @ 05:35 )             34.4                11.8   11.62 )-----------( 195      ( 01-09 @ 13:30 )             36.5       01-10    139    |  107    |  10     ----------------------------<  87     3.8     |  22     |  0.62   01-09    140    |  106    |  9      ----------------------------<  126<H>  4.1     |  23     |  0.58     Ca    8.3<L>      10 Aquilino 2024 05:35  Ca    8.7        09 Jan 2024 13:30            Urinalysis Basic - ( 10 Aquilino 2024 05:35 )    Color: x / Appearance: x / SG: x / pH: x  Gluc: 87 mg/dL / Ketone: x  / Bili: x / Urobili: x   Blood: x / Protein: x / Nitrite: x   Leuk Esterase: x / RBC: x / WBC x   Sq Epi: x / Non Sq Epi: x / Bacteria: x

## 2024-05-09 NOTE — OB RN PATIENT PROFILE - NS_GBS_INFANT_INVASIVE_OBGYN_ALL_OB_FT
Lyrica [Pregabalin]     Morphine     Requip [Ropinirole Hcl]     Tizanidine      Terrible nightmares       Prior to Visit Medications    Medication Sig Taking? Authorizing Provider   sertraline (ZOLOFT) 25 MG tablet Take 1 tablet by mouth daily Yes Liat Richards APRN   venlafaxine (EFFEXOR) 25 MG tablet Take 1 tablet by mouth Daily Yes Liat Richards APRN   lansoprazole (PREVACID) 30 MG delayed release capsule Take 1 capsule by mouth 2 times daily 1 BEFORE BREAKFAST Yes Liat Richards APRN   donepezil (ARICEPT) 10 MG tablet TAKE 1 TABLET BY MOUTH NIGHTLY Yes Liat Richards APRN   carvedilol (COREG) 12.5 MG tablet TAKE 1 TABLET BY MOUTH TWICE DAILY. Yes Liat Richards APRN   cyclobenzaprine (FLEXERIL) 10 MG tablet Take 1 tablet by mouth 3 times daily as needed for Muscle spasms Yes Liat Richards APRN   vitamin D (ERGOCALCIFEROL) 1.25 MG (11118 UT) CAPS capsule TAKE 1 CAPSULE BY MOUTH ONCE A WEEK Yes Liat Richards APRN   oxyCODONE (OXY-IR) 15 MG immediate release tablet Take 1 tablet by mouth every 6 hours as needed. Yes Hi Velazquez MD   SUMAtriptan (IMITREX) 50 MG tablet Take 1 tablet by mouth daily as needed for Migraine Yes Liat Richards APRN   atorvastatin (LIPITOR) 40 MG tablet TAKE 1 TABLET DAILY AS DIRECTED Yes Liat Richards APRN   metroNIDAZOLE (FLAGYL) 500 MG tablet TAKE 1 TABLET BY MOUTH EVERY 8 (EIGHT) HOURS FOR 9 DOSES. INDICATIONS INFECTION WITHIN THE ABDOMEN Yes Hi Velzaquez MD   colestipol (COLESTID) 1 g tablet Take 1 tablet by mouth 2 times daily Yes Liat Richards APRN   nystatin (MYCOSTATIN) 175344 UNIT/GM cream Apply topically 2 times daily. Yes Liat Richards APRN   levothyroxine (SYNTHROID) 50 MCG tablet TAKE 1 TABLET DAILY Yes Liat Richards APRN   ipratropium (ATROVENT) 0.06 % nasal spray INSTILL 2 SPRAYS INTO EACH NOSTRIL TWICE DAILY Yes Maureen Pinto MD   solifenacin (VESICARE) 10 MG tablet Take 1 tablet by mouth daily Yes Liat Richards APRN  Patient states no history

## 2024-05-13 ENCOUNTER — NON-APPOINTMENT (OUTPATIENT)
Age: 39
End: 2024-05-13

## 2024-05-15 ENCOUNTER — APPOINTMENT (OUTPATIENT)
Dept: OPHTHALMOLOGY | Facility: CLINIC | Age: 39
End: 2024-05-15
Payer: COMMERCIAL

## 2024-05-15 ENCOUNTER — NON-APPOINTMENT (OUTPATIENT)
Age: 39
End: 2024-05-15

## 2024-05-15 PROCEDURE — 92083 EXTENDED VISUAL FIELD XM: CPT

## 2024-05-15 PROCEDURE — 99204 OFFICE O/P NEW MOD 45 MIN: CPT

## 2024-05-15 PROCEDURE — 92133 CPTRZD OPH DX IMG PST SGM ON: CPT

## 2024-05-16 ENCOUNTER — NON-APPOINTMENT (OUTPATIENT)
Age: 39
End: 2024-05-16

## 2024-06-01 NOTE — ED PROVIDER NOTE - NONTENDER LOCATION
no CVAT tenderness PAST MEDICAL HISTORY:  Atrial fibrillation     Cervical stenosis of spine     Hyperlipidemia     Hypertension     Lumbar herniated disc     Pinched nerve     Sciatica of left side     Spinal stenosis of lumbar region

## 2024-06-11 ENCOUNTER — EMERGENCY (EMERGENCY)
Facility: HOSPITAL | Age: 39
LOS: 1 days | Discharge: HOME | End: 2024-06-11
Attending: EMERGENCY MEDICINE | Admitting: EMERGENCY MEDICINE
Payer: COMMERCIAL

## 2024-06-11 VITALS
HEIGHT: 65 IN | TEMPERATURE: 98 F | WEIGHT: 264.55 LBS | OXYGEN SATURATION: 98 % | DIASTOLIC BLOOD PRESSURE: 65 MMHG | RESPIRATION RATE: 18 BRPM | HEART RATE: 89 BPM | SYSTOLIC BLOOD PRESSURE: 140 MMHG

## 2024-06-11 VITALS
DIASTOLIC BLOOD PRESSURE: 69 MMHG | TEMPERATURE: 98 F | HEART RATE: 65 BPM | SYSTOLIC BLOOD PRESSURE: 109 MMHG | OXYGEN SATURATION: 100 % | RESPIRATION RATE: 18 BRPM

## 2024-06-11 DIAGNOSIS — Z98.890 OTHER SPECIFIED POSTPROCEDURAL STATES: Chronic | ICD-10-CM

## 2024-06-11 PROCEDURE — 73030 X-RAY EXAM OF SHOULDER: CPT | Mod: 26,LT

## 2024-06-11 PROCEDURE — 99284 EMERGENCY DEPT VISIT MOD MDM: CPT

## 2024-06-11 PROCEDURE — 93010 ELECTROCARDIOGRAM REPORT: CPT | Mod: 76

## 2024-06-11 RX ORDER — KETOROLAC TROMETHAMINE 30 MG/ML
15 SYRINGE (ML) INJECTION ONCE
Refills: 0 | Status: DISCONTINUED | OUTPATIENT
Start: 2024-06-11 | End: 2024-06-11

## 2024-06-11 RX ORDER — METOCLOPRAMIDE HCL 10 MG
10 TABLET ORAL ONCE
Refills: 0 | Status: COMPLETED | OUTPATIENT
Start: 2024-06-11 | End: 2024-06-11

## 2024-06-11 RX ADMIN — Medication 15 MILLIGRAM(S): at 15:41

## 2024-06-11 RX ADMIN — Medication 10 MILLIGRAM(S): at 15:42

## 2024-06-11 NOTE — ED PROVIDER NOTE - NSFOLLOWUPINSTRUCTIONS_ED_ALL_ED_FT
You were seen in the emergency department after left shoulder injury.  You had x-rays performed and you were given pain medication.  A copy of x-ray results are included in this discharge paperwork.  Please follow-up with sports medicine clinic, number is included in this discharge paperwork, or your own orthopedist for follow-up in the next 1 week.  For pain we recommend ibuprofen 600 mg every 6-8 hours as needed with food.  We recommend no heavy exertion with the left arm.  Return if you have any change or worsening symptoms.

## 2024-06-11 NOTE — ED PROVIDER NOTE - MUSCULOSKELETAL, MLM
Spine appears normal, range of motion is not limited, +left shoulder pain, full range of motion with improved movmt with passive vs active. likely tendonitis.

## 2024-06-11 NOTE — ED PROVIDER NOTE - PROGRESS NOTE DETAILS
Dr. Uli MORGAN: EKG NSR rate 67 with no ischemic changes. no twi, st elevations Dr. Dominguez: Signout received by Dr. Mcnally.  Pending x-ray results which show no fracture dislocation or separation.  Patient's pain is improved.  Recommend outpatient sports medicine or orthopedic follow-up and NSAIDs.

## 2024-06-11 NOTE — ED ADULT TRIAGE NOTE - CHIEF COMPLAINT QUOTE
pt c/o of lt sided chest pain radiating to lt shoulder and neck pain for 10 days, denies any trauma, pt recently received injections for headaches and neck pain as well

## 2024-06-11 NOTE — ED PROVIDER NOTE - PATIENT PORTAL LINK FT
You can access the FollowMyHealth Patient Portal offered by [facility] by registering at the following website: [email or web address]. By joining [facility]’s FollowMyHealth portal, you will also be able to view your health information using other applications (apps) compatible with our system.

## 2024-06-11 NOTE — ED ADULT NURSE NOTE - OBJECTIVE STATEMENT
Break RN: Pt is a 39 y/o Female, A&Ox4, ambulatory with no reported PHx, presenting to the ED with c/o left sided chest pain radiating to left shoulder/back x 10 days. Pt states she was on an excursion prior to onset of symptoms. Pt endorses pain worsening today. EKG obtained. Respirations even and unlabored, chest rise equal b/l. Pt denies SOB, fever, cough, chills, abdominal pain, N/V/D, h/a, dizziness, numbness/tingling or any urinary symptoms at this time. No acute distress noted. Medication administered as ordered, see MAR. Safety maintained throughout.

## 2024-06-11 NOTE — ED PROVIDER NOTE - CLINICAL SUMMARY MEDICAL DECISION MAKING FREE TEXT BOX
Patient presents with musculoskeletal type left shoulder pain with improvement of symptoms with passive range of motion greater than active range of motion.  Patient also being evaluated by neurologist for recurrent headaches received Novocain injection into paraspinal.  Patient has no other stroke signs and no signs of dissection or hypertensive neurological deficits and full range of motion of all extremities no facial droop or slurred speech.  To receive urgent x-ray as well as relaxants  Chronic migraine treatment evaluated likely DC with follow-up with patient.  Patient states neurologist attempting to determine if patient has idiopathic intracranial hypertension was evaluated ophthalmology and showed no papilledema.  Patient pending further evaluation by primary care team.

## 2024-08-13 NOTE — OB RN PATIENT PROFILE - TEACHING/LEARNING FACTORS IMPACT ABILITY TO LEARN

## 2024-12-01 ENCOUNTER — NON-APPOINTMENT (OUTPATIENT)
Age: 39
End: 2024-12-01

## 2025-01-14 ENCOUNTER — NON-APPOINTMENT (OUTPATIENT)
Age: 40
End: 2025-01-14

## 2025-03-19 NOTE — DISCHARGE NOTE OB - NURSING SECTION COMPLETE
Addended by: SHIMON PATE on: 3/19/2025 11:30 AM     Modules accepted: Orders     Patient/Caregiver provided printed discharge information.

## 2025-04-20 ENCOUNTER — NON-APPOINTMENT (OUTPATIENT)
Age: 40
End: 2025-04-20

## 2025-06-26 NOTE — OB PROVIDER H&P - NSPRENATALGBS_OBGYN_ALL_OB_START_DATE
PRE-ADMISSION TESTING INSTRUCTIONS FOR TOTAL JOINT PATIENTS    Take these medications the morning of surgery with a small sip of water:  metoprolol      No aspirin, advil, aleve, ibuprofen, naproxen for 3 days prior to surgery, no diet pills, decongestants, or vitamin/herbal supplements for 2 weeks prior to your surgery.     HOLD XARELTO 3DAYS PRIOR TO SURGERY    Tylenol/Acetaminophen ok to take if needed.    Do not take any insulin or diabetes medications the morning of surgery.    Your surgeon may give you Bactroban to use prior to surgery. This will be started 5 days prior to surgery, follow the directions on your prescription from your surgeon for use.      General Instructions:    DO NOT EAT SOLID FOOD AFTER MIDNIGHT THE NIGHT BEFORE SURGERY. No gum, mints, or hard candy after midnight the night before surgery.  You may drink clear liquids the day of surgery up until 2 hours before your arrival time.  Clear liquids are liquids you can see through. Nothing RED in color.    Plain water    Sports drinks      Gelatin (Jell-O)  Fruit juices without pulp such as white grape juice and apple juice  Popsicles that contain no fruit or yogurt  Tea or coffee (no cream or milk added)    It is beneficial for you to have a clear drink that contains carbohydrates 2 hours prior to your arrival time.  DRINK A BOTTLE OF SPORTS DRINK 2 HOURS BEFORE YOUR ARRIVAL TIME. IF YOU ARE DIABETIC, DRINK A LOW OR NO SUGAR SPORTS DRINK. ANY COLOR EXCEPT RED.    Patients who avoid smoking, chewing tobacco and alcohol for 4 weeks prior to surgery have a reduced risk of post-operative complications.  If at all possible, quit smoking as many days before surgery as you can.    Do not smoke, use chewing tobacco or drink alcohol after midnight the day of surgery.    Bring your C-PAP/ BI-PAP machine if you use one.  Wear clean comfortable clothes.  Do not wear contact lenses, lotion, deodorant, or make-up.  Bring a case for your glasses if  applicable.   You may brush your teeth the morning of surgery.  You may wear dentures/partials, do not put adhesive/glue on them.  Leave all other jewelry and valuables at home.  NOTIFY YOUR SURGEON IF YOU BECOME ILL, HAVE A FEVER, PRODUCTIVE COUGH, OR CANNOT BE HERE THE DAY OF SURGERY.      Preventing a Surgical Site Infection:    Shower the night before and on the morning of surgery using the chlorhexidine soap you were given.  Use a clean washcloth with the soap.  Place clean sheets on your bed after showering the night before surgery. Do not use the CHG soap on your hair, face, or private areas. Wash your body gently for five (5) minutes. Do not scrub your skin.  Dry with a clean towel and dress in clean clothing.    Do not shave the surgical area for 10 days-2 weeks prior to surgery  because the razor can irritate skin and make it easier to develop an infection.  Make sure you, your family, and all healthcare providers clean their hands with soap and water or an alcohol based hand  before caring for you or your wound.      Day of surgery:    Your surgeon’s office will advise you of your arrival time for the day of surgery.    Upon arrival, a Pre-op nurse and Anesthesia provider will review your health history, obtain vital signs, and answer questions you may have. The anesthesia provider will also discuss the type of anesthesia that will be needed for your procedure, which may include general anesthesia. The only belongings needed at this time will be your home medications and if applicable your C-PAP/BI-PAP machine.  If you are staying overnight your family can leave the rest of your belongings in the car and bring them to your room later.  A Pre-op nurse will start an IV and you may receive medication in preparation for surgery, including something to help you relax.  Your family will be able to see you in the Pre-op area.  While you are in surgery your family should notify the waiting room   if they leave the waiting room area and provide a contact phone number.    If you have any questions, you can call the Pre-Admission Department at [phone] or your surgeon's office.    Please be aware that surgery does come with discomfort.  We want to make every effort to control your discomfort so please discuss any uncontrolled symptoms with your nurse.   Your doctor will most likely have prescribed pain medications.     You may have bruising or discomfort from the tourniquet used in surgery.     Please leave all luggage in the car the morning of surgery.  You will be transported to your hospital room following the recovery period.  Your family can get your luggage at that time.      You may receive a survey regarding the care you received. Your feedback is very important and will be used to collect the necessary data to help us to continue to provide excellent care.    15-Mar-2021

## 2025-07-14 NOTE — OB RN PATIENT PROFILE - NS_ACCOMPAINEDBY_OBGYN_ALL_OB
Goal Outcome Evaluation:           Progress: improving  Outcome Evaluation: Pt is AxOx4 on room air, pt had wound care preformed and tolerated well. wife is at bedside. No needs or concerns at this time.       Spouse

## 2025-08-06 ENCOUNTER — NON-APPOINTMENT (OUTPATIENT)
Age: 40
End: 2025-08-06

## (undated) DEVICE — SOL IRR POUR NS 0.9% 1000ML

## (undated) DEVICE — BASIN SET DOUBLE

## (undated) DEVICE — DRSG TELFA 3 X 8

## (undated) DEVICE — WARMING BLANKET FULL ADULT

## (undated) DEVICE — DRAPE IRRIGATION POUCH 19X23"

## (undated) DEVICE — VENODYNE/SCD SLEEVE CALF MEDIUM

## (undated) DEVICE — PREP BETADINE SPONGE STICKS

## (undated) DEVICE — PACK D&C

## (undated) DEVICE — POSITIONER STRAP ARMBOARD VELCRO TS-30

## (undated) DEVICE — TUBING SUCTION NONCONDUCTIVE 6MM X 12FT

## (undated) DEVICE — PRESSURE INFUSOR BAG 1000ML

## (undated) DEVICE — TUBING IRR SET FOR CYSTOSCOPY 77"

## (undated) DEVICE — POSITIONER PINK PAD PIGAZZI SYSTEM

## (undated) DEVICE — DRSG PAD SANITARY OB

## (undated) DEVICE — GOWN LG

## (undated) DEVICE — SOL IRR POUR H2O 500ML

## (undated) DEVICE — PACK PERI GYN

## (undated) DEVICE — DRAPE LIGHT HANDLE COVER (GREEN)

## (undated) DEVICE — DRAPE TOWEL BLUE 17" X 24"

## (undated) DEVICE — MYOSURE SCOPE SEAL

## (undated) DEVICE — VISITEC 4X4

## (undated) DEVICE — FLUENT FMS PROCEDURE KIT

## (undated) DEVICE — LABELS BLANK W PEN